# Patient Record
Sex: MALE | Race: WHITE | NOT HISPANIC OR LATINO | Employment: FULL TIME | URBAN - METROPOLITAN AREA
[De-identification: names, ages, dates, MRNs, and addresses within clinical notes are randomized per-mention and may not be internally consistent; named-entity substitution may affect disease eponyms.]

---

## 2017-02-02 ENCOUNTER — DOCUMENTATION ONLY (OUTPATIENT)
Dept: OTOLARYNGOLOGY | Facility: CLINIC | Age: 57
End: 2017-02-02

## 2017-02-02 NOTE — PROGRESS NOTES
Spoke with Mr. Sheehan over the telephone. He derived about 2 weeks of major improvement following his last injection in 2016, but his voice has begun to tire and fatigue again. Nevertheless, it is still improved following potHe wanted to know what he should do from here. I spoke to him about the followin. Repeating the injection augmentation, potentially with a longer lasting injectable  2. Laryngeal framework surgery     I discussed the risks and benefits of each alternative with him. Ideally, he would like a permanent solution via thyroplasty. He is nearing the timepoint beyond which I would not expect meaningful recovery. He underwent his last injection augmentation about 2 months ago.     I recommended that we follow up with me for voice recording and videostroboscopy in early April. Depending on my findings and our discussions, we will likely move toward arranging laryngeal framework surgery in the weeks thereafter.    All questions were answered and he is in agreement with the above.

## 2017-03-31 ENCOUNTER — TELEPHONE (OUTPATIENT)
Dept: OTOLARYNGOLOGY | Facility: CLINIC | Age: 57
End: 2017-03-31

## 2017-03-31 NOTE — TELEPHONE ENCOUNTER
----- Message from Zeyad Dawkins sent at 3/31/2017 10:50 AM CDT -----  Contact: [phone]  Please follow up with Francheska in regard to the pt's cancelled appt and some clarity on whether or not the pt can be seen

## 2017-03-31 NOTE — TELEPHONE ENCOUNTER
Returned call to [facility]. Spoke with William. Clarified appt, and rescheduled with authorization from [facility] per William. Notified Mr. Sheehan of approval and appt rescheduled. Verbalzied understanding.

## 2017-03-31 NOTE — TELEPHONE ENCOUNTER
----- Message from Carlota Richardson sent at 3/31/2017 10:01 AM CDT -----  Call patient about a cancelled appt. Call .

## 2017-03-31 NOTE — TELEPHONE ENCOUNTER
Returned call to patient. Clarified reason for canceled appointment. Patient to call insurance company and call back later today. Verbalized understanding.

## 2017-04-03 ENCOUNTER — OFFICE VISIT (OUTPATIENT)
Dept: OTOLARYNGOLOGY | Facility: CLINIC | Age: 57
End: 2017-04-03
Payer: COMMERCIAL

## 2017-04-03 VITALS
WEIGHT: 270 LBS | DIASTOLIC BLOOD PRESSURE: 87 MMHG | HEIGHT: 75 IN | HEART RATE: 84 BPM | SYSTOLIC BLOOD PRESSURE: 131 MMHG | BODY MASS INDEX: 33.57 KG/M2

## 2017-04-03 DIAGNOSIS — F44.4 HYPERFUNCTIONAL DYSPHONIA: ICD-10-CM

## 2017-04-03 DIAGNOSIS — J38.01 UNILATERAL COMPLETE VOCAL FOLD PARALYSIS: Primary | ICD-10-CM

## 2017-04-03 DIAGNOSIS — R49.9 VOICE DISTURBANCE: ICD-10-CM

## 2017-04-03 PROCEDURE — 31579 LARYNGOSCOPY TELESCOPIC: CPT | Mod: S$PBB,,, | Performed by: OTOLARYNGOLOGY

## 2017-04-03 PROCEDURE — 31579 LARYNGOSCOPY TELESCOPIC: CPT | Mod: PBBFAC | Performed by: OTOLARYNGOLOGY

## 2017-04-03 PROCEDURE — 99214 OFFICE O/P EST MOD 30 MIN: CPT | Mod: PBBFAC,25 | Performed by: OTOLARYNGOLOGY

## 2017-04-03 PROCEDURE — 99214 OFFICE O/P EST MOD 30 MIN: CPT | Mod: 25,S$PBB,, | Performed by: OTOLARYNGOLOGY

## 2017-04-03 PROCEDURE — 99999 PR PBB SHADOW E&M-EST. PATIENT-LVL IV: CPT | Mod: PBBFAC,,, | Performed by: OTOLARYNGOLOGY

## 2017-04-03 RX ORDER — PANTOPRAZOLE SODIUM 40 MG/1
40 TABLET, DELAYED RELEASE ORAL DAILY
COMMUNITY

## 2017-04-03 NOTE — PROGRESS NOTES
"[facility]  Department of Otorhinolaryngology and Communication Sciences    Subjective:      Umer Sheehan is a 56 y.o. male who presents for follow-up. He has right vocal fold immobility, onset of symptoms 6/2016 following total thyroidectomy for non-malignancy. His treatment history is below.    Following his most recent injection augmentation, he eventually settled out to achieving about 3 weeks of stable, near-perfect voicing. Thereafter, his vocal quality began to deteriorate. He is now experiencing vocal weakness, vocal fatigue, and vocal strain. His voice is now fairly serviceable in the morning but does fatigue on him in the afternoon. Voice rest does not seem to help. His vocal symptoms have been stable for the last 1.5-2 months. He desires a definitive solution and is here to discuss his candidacy for thyroplasty.    TREATMENT HISTORY:  1. 7/29/2016 - right vocal fold injection augmentation - restylane-L  2. 11/18/2016 - right vocal fold injection augmentation - restylane-L    The patient's medications, allergies, past medical, surgical, social and family histories were reviewed and updated as appropriate.    A detailed review of systems was obtained with pertinent positives as per the above HPI, and otherwise negative.        Objective:     /87 (BP Location: Right arm, Patient Position: Sitting, BP Method: Automatic)  Pulse 84  Ht 6' 3" (1.905 m)  Wt 122.5 kg (270 lb)  BMI 33.75 kg/m2     Constitutional: comfortable, well dressed  Psychiatric: appropriate affect  Respiratory: comfortably breathing, symmetric chest rise, no stridor  Voice: moderate breathiness, moderate asthenia, moderate strain, high pitched  Head: normocephalic  Eyes: conjunctivae and sclerae clear  Indirect laryngoscopy is limited due to gag    Procedure  Flexible Laryngeal Videostroboscopy (71099): Laryngeal videostroboscopy is indicated to assess laryngeal vibratory biomechanics and vocal fold oscillation, which " cannot be assessed with a plain light examination. This was carried out transnasally with a distal chip videoendoscope. After verbal consent was obtained, the patient was positioned and the nose was topically decongested with 1% phenylephrine and topically anesthetized with 4% lidocaine. The endoscope was passed through the most patent nasal cavity and positioned to image the nasopharynx, larynx, and hypopharynx in detail. The following featured were examined: nasopharyngeal, laryngeal, hypopharyngeal masses; velopharyngeal strength, closure, and symmetry of motion; vocal fold range and symmetry of motion; laryngeal mucosal edema, erythema, inflammation, and hydration; salivary pooling; and gross laryngeal sensation. During phonation, the vocal folds were assesses for glottal closure; mucosal wave; vocal fold lesions; vibratory periodicity, amplitude, and phase symmetry; and vertical height match. The equipment was removed. The patient tolerated the procedure well without complication. All findings were normal except:  - right vocal fold immobile, paramedian; increased tone versus prior to initial injection; no evidence of recovery of function  - mild glottal insufficiency across all frequencies  - small posterior gap, negligible vertical height mismatch  - compensatory supraglottic hyperfunction/spasm      Assessment:     Umer Sheehan is a 56 y.o. male with right vocal fold paralysis, very low likelihood of recovery, secondary to thyroid surgery in 6/2016.     Plan:     Options discussed included repeating the injection with a longer-lasting material or undergoing laryngeal framework surgery.    He would benefit from right laryngeal framework surgery - medialization laryngoplasty, possible arytenoid repositioning procedure, possible cricothyroid procedure -  for the treatment of his condition.    I discussed the risks, benefits and alternatives to surgery with the patient, as well as the expected postoperative  course, with placement of a closed suction drain and overnight observation. Risks included but were not limited to pain; bleeding, hematoma; infection; reaction to the implant; scarring (skin, vocal fold); worsening of voice; implant migration and/or extrusion; need for additional and/or revision procedures; pharyngeal leak, fistula, mediastinitis; and airway obstruction which may necessitate tracheotomy. Also inherent in the procedure are the risks of anesthesia, including but not limited to cardiovascular complications (heart attack, arrhythmia); pulmonary (respiratory failure); neurologic (stroke); and death. I gave him the opportunity to ask questions and I answered all of them.     As usual, we will plan for 1 night of observation postoperatively. He will have preop testing triage by the anesthesiology team. The surgery will be tentatively scheduled for 5/2/2017.  He will return for a postoperative visit 2 weeks after surgery.    All questions were answered, and the patient is in agreement with the plan.    This visit was 25 minutes in duration, with over 50% of the time spent in direct face-to-face counseling and coordination of care regarding the issues outlined above.    Neal Cota M.D.  [facility]  Department of Otorhinolaryngology and Communication Sciences

## 2017-04-03 NOTE — MR AVS SNAPSHOT
[facility]  [address] [facility] [address]  [facility] [address]  Phone: [phone]  Fax: [phone]                  Umer Sheehan   4/3/2017 2:40 PM   Office Visit    Description:  Male : 1960   Provider:  Neal Cota MD   Department:  [facility]           Reason for Visit     Follow-up           Diagnoses this Visit        Comments    Unilateral complete vocal fold paralysis    -  Primary     Voice disturbance         Hyperfunctional dysphonia                To Do List           Goals (5 Years of Data)     None      Follow-Up and Disposition     Return in about 4 weeks (around 2017) for surgery.      [facility] On Call     [facility] On Call Nurse Care Line -  Assistance  Unless otherwise directed by your provider, please contact [facility], our nurse care line that is available for  assistance.     Registered nurses in the [facility] On Call Center provide: appointment scheduling, clinical advisement, health education, and other advisory services.  Call: [phone] (toll free)               Medications           Message regarding Medications     Verify the changes and/or additions to your medication regime listed below are the same as discussed with your clinician today.  If any of these changes or additions are incorrect, please notify your healthcare provider.             Verify that the below list of medications is an accurate representation of the medications you are currently taking.  If none reported, the list may be blank. If incorrect, please contact your healthcare provider. Carry this list with you in case of emergency.           Current Medications     ergocalciferol (VITAMIN D2) 50,000 unit Cap Take 50,000 Units by mouth every 7 days.    levothyroxine (SYNTHROID) 25 MCG tablet Take 25 mcg by mouth once daily.    pantoprazole (PROTONIX) 20 MG tablet Take 20 mg by mouth once daily.    pantoprazole (PROTONIX) 40 MG tablet  "Take 40 mg by mouth.           Clinical Reference Information           Your Vitals Were     BP Pulse Height Weight BMI    131/87 (BP Location: Right arm, Patient Position: Sitting, BP Method: Automatic) 84 6' 3" (1.905 m) 122.5 kg (270 lb) 33.75 kg/m2      Blood Pressure          Most Recent Value    BP  131/87      Allergies as of 4/3/2017     Doxycycline      Immunizations Administered on Date of Encounter - 4/3/2017     None      [facility] Sign-Up     Activating your [facility] account is as easy as 1-2-3!     1) Visit [email or web address], select Sign Up Now, enter this activation code and your date of birth, then select Next.  [email or web address]  Expires: 5/18/2017  4:02 PM      2) Create a username and password to use when you visit [facility] in the future and select a security question in case you lose your password and select Next.    3) Enter your e-mail address and click Sign Up!    Additional Information  If you have questions, please e-mail [email or web address] or call [phone] to talk to our [facility] staff. Remember, [facility] is NOT to be used for urgent needs. For medical emergencies, dial 911.         Instructions    Call with questions       Language Assistance Services     ATTENTION: Language assistance services are available, free of charge. Please call [phone].      ATENCIÓN: Si habla español, tiene a hendrickson disposición servicios gratuitos de asistencia lingüística. Llame al [phone].     CHÚ Ý: N?u b?n nói Ti?ng Vi?t, có các d?ch v? h? tr? ngôn ng? mi?n phí dành cho b?n. G?i s? [phone].         [facility] complies with applicable Federal civil rights laws and does not discriminate on the basis of race, color, national origin, age, disability, or sex.        "

## 2017-04-04 ENCOUNTER — TELEPHONE (OUTPATIENT)
Dept: OTOLARYNGOLOGY | Facility: CLINIC | Age: 57
End: 2017-04-04

## 2017-04-04 RX ORDER — LIDOCAINE HYDROCHLORIDE 10 MG/ML
1 INJECTION, SOLUTION EPIDURAL; INFILTRATION; INTRACAUDAL; PERINEURAL ONCE
Status: CANCELLED | OUTPATIENT
Start: 2017-04-04 | End: 2017-04-04

## 2017-04-04 RX ORDER — DEXAMETHASONE SODIUM PHOSPHATE 4 MG/ML
12 INJECTION, SOLUTION INTRA-ARTICULAR; INTRALESIONAL; INTRAMUSCULAR; INTRAVENOUS; SOFT TISSUE ONCE
Status: CANCELLED | OUTPATIENT
Start: 2017-04-04

## 2017-04-04 NOTE — TELEPHONE ENCOUNTER
Returned call. Patient is requesting further info regarding planned surgery. Let patient know I would have Dr. Cota call at his earliest convenience. Verbalized understanding.

## 2017-04-04 NOTE — TELEPHONE ENCOUNTER
----- Message from Carlota Richardson sent at 4/4/2017  9:27 AM CDT -----  Call patient about his visit on yesterday. Call

## 2017-04-24 ENCOUNTER — ANESTHESIA EVENT (OUTPATIENT)
Dept: SURGERY | Facility: HOSPITAL | Age: 57
End: 2017-04-24
Payer: COMMERCIAL

## 2017-04-24 RX ORDER — LEVOTHYROXINE SODIUM 125 UG/1
125 TABLET ORAL DAILY
COMMUNITY

## 2017-04-24 NOTE — ANESTHESIA PREPROCEDURE EVALUATION
Anesthesia Assessment: Preoperative EQUATION     Planned Procedure: Procedure(s) (LRB):  THYROPLASTY (Right)  Requested Anesthesia Type:Local  Surgeon: Neal Cota MD  Service: ENT  Known or anticipated Date of Surgery:5/2/2017     Surgeon notes: reviewed     Electronic QUestionnaire Assessment completed via nurse interview with patient.     Triage considerations:      The patient has no apparent active cardiac condition (No unstable coronary Syndrome such as severe unstable angina or recent [<1 month] myocardial infarction, decompensated CHF, severe valvular disease or significant arrhythmia)     Previous anesthesia records:Not available     Last PCP note: within 3 months , [facility]     Other important co-morbidities: GERDs/ CHHAYA/ arthritis/ dyslipidemia   ( hx non- toxic goiter)   Tests already available:  no local labs// attempting to retrieve from [place]      Instructions given. (See in Nurse's note)     Optimization:      Plan:   Testing: awaiting lab results      Navigation: 56 yr old male// donald 2// pt states vocal cords damaged during thyroid surgery last year. Pt is a  denies any heart issues// does use Bi-pap. [facility] Florence). Attempting to retrieve records from [place].  Phone  SALOMON Copeland RN BC 4/24/17         Electronically signed by Rosio Copeland RN at 4/24/2017 10:08 AM                                                                                                                 04/24/2017     Umer Sheehan is a 56 y.o., male with vocal cord paralysis after thyroidectomy, here for thyroplasty under local/MAC.    Past Medical History:   Diagnosis Date    Arthritis     Dyslipidemia     GERD (gastroesophageal reflux disease)     Non-toxic goiter     CHHAYA (obstructive sleep apnea)        No past surgical history on file.      Anesthesia Evaluation    I have reviewed the Patient Summary Reports.     I have reviewed the Medications.      Review of Systems  Anesthesia Hx:  No problems with previous Anesthesia  History of prior surgery of interest to airway management or planning: Denies Family Hx of Anesthesia complications.   Denies Personal Hx of Anesthesia complications.   Cardiovascular:  Cardiovascular Normal Exercise tolerance: good  Denies MI.    Denies Angina.    Pulmonary:   Denies COPD.  Denies Asthma. Sleep Apnea    Hepatic/GI:   GERD, well controlled    Neurological:  Neurology Normal    Endocrine:  Endocrine Normal        Physical Exam  General:  Obesity    Airway/Jaw/Neck:  Airway Findings: Mouth Opening: Normal Tongue: Normal  General Airway Assessment: Adult  Mallampati: III  TM Distance: Normal, at least 6 cm      Dental:  Dental Findings: In tact   Chest/Lungs:  Chest/Lungs Findings: Normal Respiratory Rate     Heart/Vascular:  Heart Findings: Rate: Normal        Mental Status:  Mental Status Findings:  Alert and Oriented         Anesthesia Plan  Type of Anesthesia, risks & benefits discussed:  Anesthesia Type:  MAC  Patient's Preference:   Intra-op Monitoring Plan:   Intra-op Monitoring Plan Comments:   Post Op Pain Control Plan:   Post Op Pain Control Plan Comments:   Induction:   IV  Beta Blocker:  Patient is not currently on a Beta-Blocker (No further documentation required).       Informed Consent: Patient understands risks and agrees with Anesthesia plan.  Questions answered. Anesthesia consent signed with patient.  ASA Score: 2     Day of Surgery Review of History & Physical:    H&P update referred to the surgeon.         Ready For Surgery From Anesthesia Perspective.

## 2017-04-24 NOTE — PRE ADMISSION SCREENING
Anesthesia Assessment: Preoperative EQUATION    Planned Procedure: Procedure(s) (LRB):  THYROPLASTY (Right)  Requested Anesthesia Type:Local  Surgeon: Neal Cota MD  Service: ENT  Known or anticipated Date of Surgery:5/2/2017    Surgeon notes: reviewed    Electronic QUestionnaire Assessment completed via nurse interview with patient.    Triage considerations:     The patient has no apparent active cardiac condition (No unstable coronary Syndrome such as severe unstable angina or recent [<1 month] myocardial infarction, decompensated CHF, severe valvular   disease or significant arrhythmia)    Previous anesthesia records:Not available    Last PCP note: within 3 months , [facility]    Other important co-morbidities: GERDs/ CHHAYA/ arthritis/ dyslipidemia     Tests already available:  no local labs// attempting to retrieve from [place]            Instructions given. (See in Nurse's note)    Optimization:      Plan:    Testing:  awaiting lab results       Navigation: 56 yr old male// donald 2// pt states vocal cords damaged during thyroid surgery last year. Pt is a  denies any heart issues// does use Bi-pap. [facility] ( Dr. Florence).  Phone  SALOMON Copeland RN BC 4/24/17

## 2017-05-02 ENCOUNTER — ANESTHESIA (OUTPATIENT)
Dept: SURGERY | Facility: HOSPITAL | Age: 57
End: 2017-05-02
Payer: COMMERCIAL

## 2017-05-02 ENCOUNTER — HOSPITAL ENCOUNTER (OUTPATIENT)
Facility: HOSPITAL | Age: 57
Discharge: HOME | End: 2017-05-03
Attending: OTOLARYNGOLOGY | Admitting: OTOLARYNGOLOGY
Payer: COMMERCIAL

## 2017-05-02 ENCOUNTER — SURGERY (OUTPATIENT)
Age: 57
End: 2017-05-02

## 2017-05-02 DIAGNOSIS — F44.4 HYPERFUNCTIONAL DYSPHONIA: ICD-10-CM

## 2017-05-02 DIAGNOSIS — R49.9 VOICE DISTURBANCE: ICD-10-CM

## 2017-05-02 DIAGNOSIS — J38.01 UNILATERAL COMPLETE VOCAL FOLD PARALYSIS: Primary | ICD-10-CM

## 2017-05-02 PROCEDURE — 71000039 HC RECOVERY, EACH ADD'L HOUR: Performed by: OTOLARYNGOLOGY

## 2017-05-02 PROCEDURE — 25000003 PHARM REV CODE 250: Performed by: NURSE ANESTHETIST, CERTIFIED REGISTERED

## 2017-05-02 PROCEDURE — 37000009 HC ANESTHESIA EA ADD 15 MINS: Performed by: OTOLARYNGOLOGY

## 2017-05-02 PROCEDURE — 27201423 OPTIME MED/SURG SUP & DEVICES STERILE SUPPLY: Performed by: OTOLARYNGOLOGY

## 2017-05-02 PROCEDURE — C1729 CATH, DRAINAGE: HCPCS | Performed by: OTOLARYNGOLOGY

## 2017-05-02 PROCEDURE — 25000003 PHARM REV CODE 250

## 2017-05-02 PROCEDURE — 36000707: Performed by: OTOLARYNGOLOGY

## 2017-05-02 PROCEDURE — D9220A PRA ANESTHESIA: Mod: CRNA,,, | Performed by: NURSE ANESTHETIST, CERTIFIED REGISTERED

## 2017-05-02 PROCEDURE — 63600175 PHARM REV CODE 636 W HCPCS

## 2017-05-02 PROCEDURE — 71000033 HC RECOVERY, INTIAL HOUR: Performed by: OTOLARYNGOLOGY

## 2017-05-02 PROCEDURE — 63600175 PHARM REV CODE 636 W HCPCS: Performed by: NURSE ANESTHETIST, CERTIFIED REGISTERED

## 2017-05-02 PROCEDURE — 36000706: Performed by: OTOLARYNGOLOGY

## 2017-05-02 PROCEDURE — 63600175 PHARM REV CODE 636 W HCPCS: Performed by: OTOLARYNGOLOGY

## 2017-05-02 PROCEDURE — 25000003 PHARM REV CODE 250: Performed by: OTOLARYNGOLOGY

## 2017-05-02 PROCEDURE — 94761 N-INVAS EAR/PLS OXIMETRY MLT: CPT

## 2017-05-02 PROCEDURE — D9220A PRA ANESTHESIA: Mod: ANES,,, | Performed by: ANESTHESIOLOGY

## 2017-05-02 PROCEDURE — 37000008 HC ANESTHESIA 1ST 15 MINUTES: Performed by: OTOLARYNGOLOGY

## 2017-05-02 DEVICE — IMPLANTABLE DEVICE: Type: IMPLANTABLE DEVICE | Site: VOCAL CORD | Status: FUNCTIONAL

## 2017-05-02 RX ORDER — ACETAMINOPHEN 650 MG/20.3ML
650 LIQUID ORAL EVERY 6 HOURS PRN
Status: DISCONTINUED | OUTPATIENT
Start: 2017-05-02 | End: 2017-05-03 | Stop reason: HOSPADM

## 2017-05-02 RX ORDER — DEXTROSE MONOHYDRATE, SODIUM CHLORIDE, AND POTASSIUM CHLORIDE 50; 1.49; 4.5 G/1000ML; G/1000ML; G/1000ML
INJECTION, SOLUTION INTRAVENOUS CONTINUOUS
Status: DISCONTINUED | OUTPATIENT
Start: 2017-05-02 | End: 2017-05-03 | Stop reason: HOSPADM

## 2017-05-02 RX ORDER — AMOXICILLIN 250 MG
1 CAPSULE ORAL 2 TIMES DAILY
Status: DISCONTINUED | OUTPATIENT
Start: 2017-05-02 | End: 2017-05-03 | Stop reason: HOSPADM

## 2017-05-02 RX ORDER — MIDAZOLAM HYDROCHLORIDE 1 MG/ML
INJECTION, SOLUTION INTRAMUSCULAR; INTRAVENOUS
Status: DISCONTINUED | OUTPATIENT
Start: 2017-05-02 | End: 2017-05-02

## 2017-05-02 RX ORDER — CEPHALEXIN 500 MG/1
500 CAPSULE ORAL EVERY 6 HOURS
Qty: 28 CAPSULE | Refills: 0 | Status: SHIPPED | OUTPATIENT
Start: 2017-05-02 | End: 2017-05-07

## 2017-05-02 RX ORDER — CEFAZOLIN SODIUM 2 G/50ML
2 SOLUTION INTRAVENOUS
Status: DISCONTINUED | OUTPATIENT
Start: 2017-05-02 | End: 2017-05-03 | Stop reason: HOSPADM

## 2017-05-02 RX ORDER — PROPOFOL 10 MG/ML
VIAL (ML) INTRAVENOUS
Status: DISCONTINUED | OUTPATIENT
Start: 2017-05-02 | End: 2017-05-02

## 2017-05-02 RX ORDER — EPINEPHRINE 1 MG/ML
INJECTION, SOLUTION INTRACARDIAC; INTRAMUSCULAR; INTRAVENOUS; SUBCUTANEOUS
Status: DISCONTINUED | OUTPATIENT
Start: 2017-05-02 | End: 2017-05-02 | Stop reason: HOSPADM

## 2017-05-02 RX ORDER — METOPROLOL TARTRATE 1 MG/ML
INJECTION, SOLUTION INTRAVENOUS
Status: DISCONTINUED | OUTPATIENT
Start: 2017-05-02 | End: 2017-05-02

## 2017-05-02 RX ORDER — ONDANSETRON 8 MG/1
8 TABLET, ORALLY DISINTEGRATING ORAL EVERY 12 HOURS PRN
Qty: 20 TABLET | Refills: 0 | Status: ON HOLD | OUTPATIENT
Start: 2017-05-02 | End: 2020-01-15 | Stop reason: SDUPTHER

## 2017-05-02 RX ORDER — ONDANSETRON 2 MG/ML
4 INJECTION INTRAMUSCULAR; INTRAVENOUS EVERY 12 HOURS PRN
Status: DISCONTINUED | OUTPATIENT
Start: 2017-05-02 | End: 2017-05-03 | Stop reason: HOSPADM

## 2017-05-02 RX ORDER — HYDROCODONE BITARTRATE AND ACETAMINOPHEN 5; 325 MG/1; MG/1
TABLET ORAL
Status: COMPLETED
Start: 2017-05-02 | End: 2017-05-02

## 2017-05-02 RX ORDER — LIDOCAINE HCL/PF 100 MG/5ML
SYRINGE (ML) INTRAVENOUS
Status: DISCONTINUED | OUTPATIENT
Start: 2017-05-02 | End: 2017-05-02

## 2017-05-02 RX ORDER — DEXAMETHASONE 4 MG/1
8 TABLET ORAL EVERY 8 HOURS
Status: DISCONTINUED | OUTPATIENT
Start: 2017-05-02 | End: 2017-05-03 | Stop reason: HOSPADM

## 2017-05-02 RX ORDER — METHYLPREDNISOLONE 4 MG/1
TABLET ORAL
Qty: 1 PACKAGE | Refills: 0 | Status: SHIPPED | OUTPATIENT
Start: 2017-05-02 | End: 2019-01-21

## 2017-05-02 RX ORDER — SODIUM CHLORIDE 9 MG/ML
INJECTION, SOLUTION INTRAVENOUS CONTINUOUS
Status: DISCONTINUED | OUTPATIENT
Start: 2017-05-02 | End: 2017-05-02

## 2017-05-02 RX ORDER — HYDROCODONE BITARTRATE AND ACETAMINOPHEN 5; 325 MG/1; MG/1
1 TABLET ORAL EVERY 6 HOURS PRN
Qty: 40 TABLET | Refills: 0 | Status: SHIPPED | OUTPATIENT
Start: 2017-05-02 | End: 2019-01-21

## 2017-05-02 RX ORDER — PROPOFOL 10 MG/ML
VIAL (ML) INTRAVENOUS CONTINUOUS PRN
Status: DISCONTINUED | OUTPATIENT
Start: 2017-05-02 | End: 2017-05-02

## 2017-05-02 RX ORDER — MORPHINE SULFATE 2 MG/ML
2 INJECTION, SOLUTION INTRAMUSCULAR; INTRAVENOUS EVERY 4 HOURS PRN
Status: DISCONTINUED | OUTPATIENT
Start: 2017-05-02 | End: 2017-05-03 | Stop reason: HOSPADM

## 2017-05-02 RX ORDER — LIDOCAINE HYDROCHLORIDE 40 MG/ML
INJECTION, SOLUTION RETROBULBAR
Status: DISCONTINUED | OUTPATIENT
Start: 2017-05-02 | End: 2017-05-02 | Stop reason: HOSPADM

## 2017-05-02 RX ORDER — IBUPROFEN 600 MG/1
600 TABLET ORAL EVERY 6 HOURS PRN
Status: DISCONTINUED | OUTPATIENT
Start: 2017-05-02 | End: 2017-05-03 | Stop reason: HOSPADM

## 2017-05-02 RX ORDER — BACITRACIN ZINC 500 UNIT/G
OINTMENT (GRAM) TOPICAL
Status: DISCONTINUED | OUTPATIENT
Start: 2017-05-02 | End: 2017-05-02 | Stop reason: HOSPADM

## 2017-05-02 RX ORDER — BUPIVACAINE HYDROCHLORIDE AND EPINEPHRINE 5; 5 MG/ML; UG/ML
INJECTION, SOLUTION EPIDURAL; INTRACAUDAL; PERINEURAL
Status: DISCONTINUED | OUTPATIENT
Start: 2017-05-02 | End: 2017-05-02 | Stop reason: HOSPADM

## 2017-05-02 RX ORDER — CEFAZOLIN SODIUM 2 G/50ML
2 SOLUTION INTRAVENOUS
Status: DISCONTINUED | OUTPATIENT
Start: 2017-05-02 | End: 2017-05-02

## 2017-05-02 RX ORDER — HYDROCODONE BITARTRATE AND ACETAMINOPHEN 5; 325 MG/1; MG/1
1 TABLET ORAL EVERY 4 HOURS PRN
Status: DISCONTINUED | OUTPATIENT
Start: 2017-05-02 | End: 2017-05-03 | Stop reason: HOSPADM

## 2017-05-02 RX ORDER — LIDOCAINE HYDROCHLORIDE 10 MG/ML
1 INJECTION, SOLUTION EPIDURAL; INFILTRATION; INTRACAUDAL; PERINEURAL ONCE
Status: COMPLETED | OUTPATIENT
Start: 2017-05-02 | End: 2017-05-02

## 2017-05-02 RX ORDER — ZOLPIDEM TARTRATE 5 MG/1
5 TABLET ORAL NIGHTLY PRN
Status: DISCONTINUED | OUTPATIENT
Start: 2017-05-02 | End: 2017-05-03 | Stop reason: HOSPADM

## 2017-05-02 RX ORDER — LIDOCAINE HYDROCHLORIDE AND EPINEPHRINE 10; 10 MG/ML; UG/ML
INJECTION, SOLUTION INFILTRATION; PERINEURAL
Status: DISCONTINUED | OUTPATIENT
Start: 2017-05-02 | End: 2017-05-02 | Stop reason: HOSPADM

## 2017-05-02 RX ORDER — DEXAMETHASONE SODIUM PHOSPHATE 4 MG/ML
12 INJECTION, SOLUTION INTRA-ARTICULAR; INTRALESIONAL; INTRAMUSCULAR; INTRAVENOUS; SOFT TISSUE ONCE
Status: COMPLETED | OUTPATIENT
Start: 2017-05-02 | End: 2017-05-02

## 2017-05-02 RX ADMIN — STANDARDIZED SENNA CONCENTRATE AND DOCUSATE SODIUM 1 TABLET: 8.6; 5 TABLET, FILM COATED ORAL at 09:05

## 2017-05-02 RX ADMIN — MORPHINE SULFATE 2 MG: 2 INJECTION, SOLUTION INTRAMUSCULAR; INTRAVENOUS at 07:05

## 2017-05-02 RX ADMIN — LIDOCAINE HYDROCHLORIDE AND EPINEPHRINE 20 ML: 10; 10 INJECTION, SOLUTION INFILTRATION; PERINEURAL at 02:05

## 2017-05-02 RX ADMIN — CEFAZOLIN SODIUM 2 G: 2 SOLUTION INTRAVENOUS at 07:05

## 2017-05-02 RX ADMIN — LIDOCAINE HYDROCHLORIDE 1 MG: 10 INJECTION, SOLUTION EPIDURAL; INFILTRATION; INTRACAUDAL; PERINEURAL at 10:05

## 2017-05-02 RX ADMIN — LIDOCAINE HYDROCHLORIDE 4 ML: 40 INJECTION, SOLUTION RETROBULBAR; TOPICAL at 04:05

## 2017-05-02 RX ADMIN — HYDROCODONE BITARTRATE AND ACETAMINOPHEN 1 TABLET: 5; 325 TABLET ORAL at 05:05

## 2017-05-02 RX ADMIN — METOPROLOL TARTRATE 1 MG: 5 INJECTION, SOLUTION INTRAVENOUS at 05:05

## 2017-05-02 RX ADMIN — PROPOFOL 50 MCG/KG/MIN: 10 INJECTION, EMULSION INTRAVENOUS at 05:05

## 2017-05-02 RX ADMIN — MIDAZOLAM HYDROCHLORIDE 1 MG: 1 INJECTION, SOLUTION INTRAMUSCULAR; INTRAVENOUS at 03:05

## 2017-05-02 RX ADMIN — BUPIVACAINE HYDROCHLORIDE AND EPINEPHRINE BITARTRATE 20 ML: 5; .0091 INJECTION, SOLUTION EPIDURAL; INTRACAUDAL; PERINEURAL at 02:05

## 2017-05-02 RX ADMIN — DEXTROSE MONOHYDRATE, SODIUM CHLORIDE, AND POTASSIUM CHLORIDE: 50; 4.5; 1.49 INJECTION, SOLUTION INTRAVENOUS at 09:05

## 2017-05-02 RX ADMIN — PROPOFOL 30 MG: 10 INJECTION, EMULSION INTRAVENOUS at 01:05

## 2017-05-02 RX ADMIN — LIDOCAINE HYDROCHLORIDE 75 MG: 20 INJECTION, SOLUTION INTRAVENOUS at 01:05

## 2017-05-02 RX ADMIN — MIDAZOLAM HYDROCHLORIDE 1 MG: 1 INJECTION, SOLUTION INTRAMUSCULAR; INTRAVENOUS at 02:05

## 2017-05-02 RX ADMIN — METOPROLOL TARTRATE 2 MG: 5 INJECTION, SOLUTION INTRAVENOUS at 03:05

## 2017-05-02 RX ADMIN — EPINEPHRINE 1 ML: 1 INJECTION, SOLUTION INTRAMUSCULAR; SUBCUTANEOUS at 02:05

## 2017-05-02 RX ADMIN — MIDAZOLAM HYDROCHLORIDE 2 MG: 1 INJECTION, SOLUTION INTRAMUSCULAR; INTRAVENOUS at 01:05

## 2017-05-02 RX ADMIN — SODIUM CHLORIDE: 0.9 INJECTION, SOLUTION INTRAVENOUS at 11:05

## 2017-05-02 RX ADMIN — MORPHINE SULFATE 2 MG: 2 INJECTION, SOLUTION INTRAMUSCULAR; INTRAVENOUS at 11:05

## 2017-05-02 RX ADMIN — PROPOFOL 50 MG: 10 INJECTION, EMULSION INTRAVENOUS at 01:05

## 2017-05-02 RX ADMIN — PROPOFOL 20 MG: 10 INJECTION, EMULSION INTRAVENOUS at 05:05

## 2017-05-02 RX ADMIN — IBUPROFEN 600 MG: 600 TABLET, FILM COATED ORAL at 09:05

## 2017-05-02 RX ADMIN — COCAINE HYDROCHLORIDE 4 ML: 40 SOLUTION TOPICAL at 02:05

## 2017-05-02 RX ADMIN — DEXAMETHASONE SODIUM PHOSPHATE 12 MG: 4 INJECTION, SOLUTION INTRAMUSCULAR; INTRAVENOUS at 10:05

## 2017-05-02 RX ADMIN — BACITRACIN ZINC 1 TUBE: 500 OINTMENT TOPICAL at 02:05

## 2017-05-02 RX ADMIN — DEXAMETHASONE 8 MG: 4 TABLET ORAL at 09:05

## 2017-05-02 NOTE — BRIEF OP NOTE
[facility]  Brief Operative Note    SUMMARY     Surgery Date: 5/2/2017     Surgeon(s) and Role:     * Dinh King MD - Resident - Assisting     * Neal Cota MD - Primary        Pre-op Diagnosis:  Voice disturbance [R49.9]  Unilateral complete vocal fold paralysis [J38.01]  Hyperfunctional dysphonia [F44.4]    Post-op Diagnosis:  Post-Op Diagnosis Codes:     * Voice disturbance [R49.9]     * Unilateral complete vocal fold paralysis [J38.01]     * Hyperfunctional dysphonia [F44.4]    Procedure(s) (LRB):  THYROPLASTY (Right)    Anesthesia: Local    Description of Procedure: R thyroplasty (type I) with [place] silastic implant    Description of the findings of the procedure: see op note    Estimated Blood Loss: * No values recorded between 5/2/2017  1:42 PM and 5/2/2017  5:32 PM *         Specimens:   Specimen     None

## 2017-05-02 NOTE — H&P (VIEW-ONLY)
"[facility]  Department of Otorhinolaryngology and Communication Sciences    Subjective:      Umer Sheehan is a 56 y.o. male who presents for follow-up. He has right vocal fold immobility, onset of symptoms 6/2016 following total thyroidectomy for non-malignancy. His treatment history is below.    Following his most recent injection augmentation, he eventually settled out to achieving about 3 weeks of stable, near-perfect voicing. Thereafter, his vocal quality began to deteriorate. He is now experiencing vocal weakness, vocal fatigue, and vocal strain. His voice is now fairly serviceable in the morning but does fatigue on him in the afternoon. Voice rest does not seem to help. His vocal symptoms have been stable for the last 1.5-2 months. He desires a definitive solution and is here to discuss his candidacy for thyroplasty.    TREATMENT HISTORY:  1. 7/29/2016 - right vocal fold injection augmentation - restylane-L  2. 11/18/2016 - right vocal fold injection augmentation - restylane-L    The patient's medications, allergies, past medical, surgical, social and family histories were reviewed and updated as appropriate.    A detailed review of systems was obtained with pertinent positives as per the above HPI, and otherwise negative.        Objective:     /87 (BP Location: Right arm, Patient Position: Sitting, BP Method: Automatic)  Pulse 84  Ht 6' 3" (1.905 m)  Wt 122.5 kg (270 lb)  BMI 33.75 kg/m2     Constitutional: comfortable, well dressed  Psychiatric: appropriate affect  Respiratory: comfortably breathing, symmetric chest rise, no stridor  Voice: moderate breathiness, moderate asthenia, moderate strain, high pitched  Head: normocephalic  Eyes: conjunctivae and sclerae clear  Indirect laryngoscopy is limited due to gag    Procedure  Flexible Laryngeal Videostroboscopy (16045): Laryngeal videostroboscopy is indicated to assess laryngeal vibratory biomechanics and vocal fold oscillation, which " cannot be assessed with a plain light examination. This was carried out transnasally with a distal chip videoendoscope. After verbal consent was obtained, the patient was positioned and the nose was topically decongested with 1% phenylephrine and topically anesthetized with 4% lidocaine. The endoscope was passed through the most patent nasal cavity and positioned to image the nasopharynx, larynx, and hypopharynx in detail. The following featured were examined: nasopharyngeal, laryngeal, hypopharyngeal masses; velopharyngeal strength, closure, and symmetry of motion; vocal fold range and symmetry of motion; laryngeal mucosal edema, erythema, inflammation, and hydration; salivary pooling; and gross laryngeal sensation. During phonation, the vocal folds were assesses for glottal closure; mucosal wave; vocal fold lesions; vibratory periodicity, amplitude, and phase symmetry; and vertical height match. The equipment was removed. The patient tolerated the procedure well without complication. All findings were normal except:  - right vocal fold immobile, paramedian; increased tone versus prior to initial injection; no evidence of recovery of function  - mild glottal insufficiency across all frequencies  - small posterior gap, negligible vertical height mismatch  - compensatory supraglottic hyperfunction/spasm      Assessment:     Umer Sheehan is a 56 y.o. male with right vocal fold paralysis, very low likelihood of recovery, secondary to thyroid surgery in 6/2016.     Plan:     Options discussed included repeating the injection with a longer-lasting material or undergoing laryngeal framework surgery.    He would benefit from right laryngeal framework surgery - medialization laryngoplasty, possible arytenoid repositioning procedure, possible cricothyroid procedure -  for the treatment of his condition.    I discussed the risks, benefits and alternatives to surgery with the patient, as well as the expected postoperative  course, with placement of a closed suction drain and overnight observation. Risks included but were not limited to pain; bleeding, hematoma; infection; reaction to the implant; scarring (skin, vocal fold); worsening of voice; implant migration and/or extrusion; need for additional and/or revision procedures; pharyngeal leak, fistula, mediastinitis; and airway obstruction which may necessitate tracheotomy. Also inherent in the procedure are the risks of anesthesia, including but not limited to cardiovascular complications (heart attack, arrhythmia); pulmonary (respiratory failure); neurologic (stroke); and death. I gave him the opportunity to ask questions and I answered all of them.     As usual, we will plan for 1 night of observation postoperatively. He will have preop testing triage by the anesthesiology team. The surgery will be tentatively scheduled for 5/2/2017.  He will return for a postoperative visit 2 weeks after surgery.    All questions were answered, and the patient is in agreement with the plan.    This visit was 25 minutes in duration, with over 50% of the time spent in direct face-to-face counseling and coordination of care regarding the issues outlined above.    Neal Cota M.D.  [facility]  Department of Otorhinolaryngology and Communication Sciences

## 2017-05-02 NOTE — IP AVS SNAPSHOT
[facility]  [address]  [facility] [address]  Phone: [phone]           Patient Discharge Instructions   Our goal is to set you up for success. This packet includes information on your condition, medications, and your home care.  It will help you care for yourself to prevent having to return to the hospital.     Please ask your nurse if you have any questions.      There are many details to remember when preparing to leave the hospital. Here is what you will need to do:    1. Take your medicine. If you are prescribed medications, review your Medication List on the following pages. You may have new medications to  at the pharmacy and others that you'll need to stop taking. Review the instructions for how and when to take your medications. Talk with your doctor or nurses if you are unsure of what to do.     2. Go to your follow-up appointments. Specific follow-up information is listed in the following pages. Your may be contacted by a nurse or clinical provider about future appointments. Be sure we have all of the phone numbers to reach you. Please contact your provider's office if you are unable to make an appointment.     3. Watch for warning signs. Your doctor or nurse will give you detailed warning signs to watch for and when to call for assistance. These instructions may also include educational information about your condition. If you experience any of warning signs to your health, call your doctor.           [facility]  Unless otherwise directed by your provider, please   contact [facility], our nurse care line   that is available for 24/7 assistance.     [phone] (toll-free)     Registered nurses in the [facility]   provide: appointment scheduling, clinical advisement, health education, and other advisory services.                  ** Verify the list of medication(s) below is accurate and up to date. Carry this with you in case of  emergency. If your medications have changed, please notify your healthcare provider.             Medication List      START taking these medications        Additional Info                      cephALEXin 500 MG capsule   Commonly known as:  KEFLEX   Quantity:  28 capsule   Refills:  0   Dose:  500 mg    Instructions:  Take 1 capsule (500 mg total) by mouth every 6 (six) hours.     Begin Date    AM    Noon    PM    Bedtime       hydrocodone-acetaminophen 5-325mg 5-325 mg per tablet   Commonly known as:  NORCO   Quantity:  40 tablet   Refills:  0   Dose:  1 tablet    Last time this was given:  1 tablet on 5/3/2017  1:43 AM   Instructions:  Take 1 tablet by mouth every 6 (six) hours as needed for Pain.     Begin Date    AM    Noon    PM    Bedtime       methylPREDNISolone 4 mg tablet   Commonly known as:  MEDROL DOSEPACK   Quantity:  1 Package   Refills:  0    Instructions:  use as directed     Begin Date    AM    Noon    PM    Bedtime       ondansetron 8 MG Tbdl   Commonly known as:  ZOFRAN-ODT   Quantity:  20 tablet   Refills:  0   Dose:  8 mg    Instructions:  Take 1 tablet (8 mg total) by mouth every 12 (twelve) hours as needed.     Begin Date    AM    Noon    PM    Bedtime         CONTINUE taking these medications        Additional Info                      BENTYL ORAL   Refills:  0    Instructions:  Take by mouth 3 (three) times daily with meals.     Begin Date    AM    Noon    PM    Bedtime       * pantoprazole 20 MG tablet   Commonly known as:  PROTONIX   Refills:  0   Dose:  20 mg    Instructions:  Take 20 mg by mouth once daily.     Begin Date    AM    Noon    PM    Bedtime       * pantoprazole 40 MG tablet   Commonly known as:  PROTONIX   Refills:  0   Dose:  40 mg    Instructions:  Take 40 mg by mouth.     Begin Date    AM    Noon    PM    Bedtime       * SYNTHROID 25 MCG tablet   Refills:  0   Dose:  25 mcg   Generic drug:  levothyroxine    Instructions:  Take 25 mcg by mouth once daily.     Begin Date     AM    Noon    PM    Bedtime       * levothyroxine 125 MCG tablet   Commonly known as:  SYNTHROID   Refills:  0   Dose:  125 mcg    Instructions:  Take 125 mcg by mouth once daily.     Begin Date    AM    Noon    PM    Bedtime       VITAMIN D-3 ORAL   Refills:  0    Instructions:  Take by mouth once daily.     Begin Date    AM    Noon    PM    Bedtime       VITAMIN D2 50,000 unit Cap   Refills:  0   Dose:  38771 Units   Generic drug:  ergocalciferol    Instructions:  Take 50,000 Units by mouth every 7 days.     Begin Date    AM    Noon    PM    Bedtime       * Notice:  This list has 4 medication(s) that are the same as other medications prescribed for you. Read the directions carefully, and ask your doctor or other care provider to review them with you.         Where to Get Your Medications      You can get these medications from any pharmacy     Bring a paper prescription for each of these medications     cephALEXin 500 MG capsule    hydrocodone-acetaminophen 5-325mg 5-325 mg per tablet    methylPREDNISolone 4 mg tablet    ondansetron 8 MG Tbdl                  Please bring to all follow up appointments:    1. A copy of your discharge instructions.  2. All medicines you are currently taking in their original bottles.  3. Identification and insurance card.    Please arrive 15 minutes ahead of scheduled appointment time.    Please call 24 hours in advance if you must reschedule your appointment and/or time.        Follow-up Information     Follow up with Neal Cota MD. Schedule an appointment as soon as possible for a visit in 2 weeks.    Specialty:  Otolaryngology    Why:  For wound re-check    Contact information:    [address]  [facility] [address]  [phone]          Discharge Instructions     Future Orders    Activity as tolerated     Comments:    Light activity only. No heavy lifting, straining, stooping, exercising.    Call MD for:  difficulty breathing or increased cough     Call MD for:   "increased confusion or weakness     Call MD for:  persistent dizziness, light-headedness, or visual disturbances     Call MD for:  persistent nausea and vomiting or diarrhea     Call MD for:  redness, tenderness, or signs of infection (pain, swelling, redness, odor or green/yellow discharge around incision site)     Call MD for:  severe persistent headache     Call MD for:  severe uncontrolled pain     Call MD for:  temperature >100.4     Call MD for:  worsening rash     Diet general     Questions:    Total calories:      Fat restriction, if any:      Protein restriction, if any:      Na restriction, if any:      Fluid restriction:      Additional restrictions:      Lifting restrictions     No dressing needed     Weight bearing restrictions (specify)         Primary Diagnosis     Your primary diagnosis was:  Complete Paralysis Of One Vocal Cord      Admission Information     Date & Time Provider Department CSN    5/2/2017  9:26 AM Neal Cota MD [facility] [id number]      Care Providers     Provider Role Specialty Primary office phone    Neal Cota MD Attending Provider Otolaryngology [phone]    Neal Cota MD Surgeon  Otolaryngology [phone]      Your Vitals Were     BP Pulse Temp Resp Height Weight    140/82 (BP Location: Right arm, Patient Position: Lying, BP Method: Automatic) 89 96.1 °F (35.6 °C) (Oral) 14 6' 3" (1.905 m) 122.5 kg (270 lb)    SpO2 BMI             94% 33.75 kg/m2         Recent Lab Values     No lab values to display.      Allergies as of 5/3/2017        Reactions    Doxycycline       Advance Directives     An advance directive is a document which, in the event you are no longer able to make decisions for yourself, tells your healthcare team what kind of treatment you do or do not want to receive, or who you would like to make those decisions for you.  If you do not currently have an advance directive, [facility] encourages you to create one.  For " more information call:  [phone]), [phone] ([phone]),  or log on to [email or web address].        Language Assistance Services     ATTENTION: Language assistance services are available, free of charge. Please call [phone].      ATENCIÓN: Si habla emmanuel, tiene a hendrickson disposición servicios gratuitos de asistencia lingüística. Llame al [phone].     [facility] Ý: N?u b?n nói Ti?ng Vi?t, có các d?ch v? h? tr? ngôn ng? mi?n phí dành cho b?n. G?i s? [phone].        [facility] Sign-Up     Activating your [facility] account is as easy as 1-2-3!     1) Visit [email or web address], select Sign Up Now, enter this activation code and your date of birth, then select Next.  [email or web address]  Expires: 5/18/2017  4:02 PM      2) Create a username and password to use when you visit [facility] in the future and select a security question in case you lose your password and select Next.    3) Enter your e-mail address and click Sign Up!    Additional Information  If you have questions, please e-mail [email or web address] or call [phone] to talk to our [facility] staff. Remember, [facility] is NOT to be used for urgent needs. For medical emergencies, dial 911.          [facility] complies with applicable Federal civil rights laws and does not discriminate on the basis of race, color, national origin, age, disability, or sex.

## 2017-05-02 NOTE — TRANSFER OF CARE
"Anesthesia Transfer of Care Note    Patient: Umer Sheehan    Procedure(s) Performed: Procedure(s) (LRB):  THYROPLASTY (Right)    Patient location: PACU    Anesthesia Type: MAC    Transport from OR: Transported from OR on room air with adequate spontaneous ventilation    Post pain: adequate analgesia    Post assessment: no apparent anesthetic complications    Post vital signs: stable    Level of consciousness: awake, oriented and alert    Nausea/Vomiting: no nausea/vomiting    Complications: none          Last vitals:   Visit Vitals    BP (!) 147/84    Pulse 101    Temp 36.4 °C (97.5 °F) (Oral)    Resp 20    Ht 6' 3" (1.905 m)    Wt 122.5 kg (270 lb)    SpO2 95%    BMI 33.75 kg/m2     "

## 2017-05-02 NOTE — INTERVAL H&P NOTE
The patient has been examined and the H&P has been reviewed:    I concur with the findings and no changes have occurred since H&P was written.  Plan to proceed with right (possible bilateral) laryngeal framework surgery - medialization laryngoplasty, possible arytenoid repositioning procedure, possible cricothyroid procedure. Pt will remain in OBS overnight as planned.    Anesthesia/Surgery risks, benefits and alternative options discussed and understood by patient/family.          Active Hospital Problems    Diagnosis  POA    Unilateral complete vocal fold paralysis [J38.01]  Yes      Resolved Hospital Problems    Diagnosis Date Resolved POA   No resolved problems to display.

## 2017-05-03 VITALS
HEART RATE: 89 BPM | SYSTOLIC BLOOD PRESSURE: 140 MMHG | WEIGHT: 270 LBS | DIASTOLIC BLOOD PRESSURE: 82 MMHG | OXYGEN SATURATION: 94 % | HEIGHT: 75 IN | TEMPERATURE: 96 F | RESPIRATION RATE: 14 BRPM | BODY MASS INDEX: 33.57 KG/M2

## 2017-05-03 PROCEDURE — 36000707: Performed by: OTOLARYNGOLOGY

## 2017-05-03 PROCEDURE — 36000706: Performed by: OTOLARYNGOLOGY

## 2017-05-03 PROCEDURE — 63600175 PHARM REV CODE 636 W HCPCS

## 2017-05-03 PROCEDURE — 25000003 PHARM REV CODE 250

## 2017-05-03 PROCEDURE — 31591 LARYNGOPLASTY MEDIALIZATION: CPT | Mod: LT,,, | Performed by: OTOLARYNGOLOGY

## 2017-05-03 RX ADMIN — STANDARDIZED SENNA CONCENTRATE AND DOCUSATE SODIUM 1 TABLET: 8.6; 5 TABLET, FILM COATED ORAL at 09:05

## 2017-05-03 RX ADMIN — MORPHINE SULFATE 2 MG: 2 INJECTION, SOLUTION INTRAMUSCULAR; INTRAVENOUS at 06:05

## 2017-05-03 RX ADMIN — DEXAMETHASONE 8 MG: 4 TABLET ORAL at 05:05

## 2017-05-03 RX ADMIN — HYDROCODONE BITARTRATE AND ACETAMINOPHEN 1 TABLET: 5; 325 TABLET ORAL at 09:05

## 2017-05-03 RX ADMIN — HYDROCODONE BITARTRATE AND ACETAMINOPHEN 1 TABLET: 5; 325 TABLET ORAL at 01:05

## 2017-05-03 RX ADMIN — ZOLPIDEM TARTRATE 5 MG: 5 TABLET, FILM COATED ORAL at 01:05

## 2017-05-03 RX ADMIN — CEFAZOLIN SODIUM 2 G: 2 SOLUTION INTRAVENOUS at 04:05

## 2017-05-03 NOTE — ANESTHESIA RELEASE NOTE
"Anesthesia Release from PACU Note    Patient: Umer Sheehan    Procedure(s) Performed: Procedure(s) (LRB):  THYROPLASTY (Right)    Anesthesia type: general    Post pain: Adequate analgesia    Post assessment: no apparent anesthetic complications, tolerated procedure well and no evidence of recall    Last Vitals:   Visit Vitals    BP (!) 151/87 (BP Location: Right arm, Patient Position: Lying, BP Method: Automatic)    Pulse 96    Temp 36.8 °C (98.3 °F) (Oral)    Resp 16    Ht 6' 3" (1.905 m)    Wt 122.5 kg (270 lb)    SpO2 96%    BMI 33.75 kg/m2       Post vital signs: stable    Level of consciousness: awake, alert  and oriented    Nausea/Vomiting: no nausea/no vomiting    Complications: none    Airway Patency: patent    Respiratory: unassisted, spontaneous ventilation, room air    Cardiovascular: stable and blood pressure at baseline    Hydration: euvolemic     "

## 2017-05-03 NOTE — PROGRESS NOTES
Discharge instructions and prescriptions given and explained to patient. Pt verbalized understanding and questions answered by RN. IV discontinued. Spouse at bedside. Pt left unit ambulating with spouse holding belongings.

## 2017-05-03 NOTE — DISCHARGE SUMMARY
[facility]  Discharge Summary  Otorhinolaryngology      Admit Date: 5/2/2017    Discharge Date and Time:  05/03/2017 7:32 AM    Attending Physician: Neal Cota MD     Discharge Physician: Dinh King    Reason for Admission: airway obs s/p thyroplasty    Procedures Performed: Procedure(s) (LRB):  THYROPLASTY (Right)    Hospital Course (synopsis of major diagnoses, care, treatment, and services provided during the course of the hospital stay): Following completion of an electively scheduled procedure, the patient was transferred to the Floor for postoperative monitoring.   His hospital course was uneventful and noted for adequate pain control and PO intake following surgery.  he is discharged home in good condition and will follow-up with Dr. Cota as scheduled.          Consults: none    Significant Diagnostic Studies: n/a    Final Diagnoses:    Principal Problem: Unilateral complete vocal fold paralysis   Secondary Diagnoses:   Active Hospital Problems    Diagnosis  POA    *Unilateral complete vocal fold paralysis [J38.01]  Yes      Resolved Hospital Problems    Diagnosis Date Resolved POA   No resolved problems to display.    and n/a    Discharged Condition: good    Disposition: Home or Self Care    Follow Up/Patient Instructions:      Medications:  Reconciled Home Medications:   Current Discharge Medication List      START taking these medications    Details   cephALEXin (KEFLEX) 500 MG capsule Take 1 capsule (500 mg total) by mouth every 6 (six) hours.  Qty: 28 capsule, Refills: 0      hydrocodone-acetaminophen 5-325mg (NORCO) 5-325 mg per tablet Take 1 tablet by mouth every 6 (six) hours as needed for Pain.  Qty: 40 tablet, Refills: 0      methylPREDNISolone (MEDROL DOSEPACK) 4 mg tablet use as directed  Qty: 1 Package, Refills: 0      ondansetron (ZOFRAN-ODT) 8 MG TbDL Take 1 tablet (8 mg total) by mouth every 12 (twelve) hours as needed.  Qty: 20 tablet, Refills: 0          CONTINUE these medications which have NOT CHANGED    Details   CALCIUM CARBONATE/VITAMIN D3 (VITAMIN D-3 ORAL) Take by mouth once daily.      DICYCLOMINE HCL (BENTYL ORAL) Take by mouth 3 (three) times daily with meals.      !! levothyroxine (SYNTHROID) 125 MCG tablet Take 125 mcg by mouth once daily.      !! levothyroxine (SYNTHROID) 25 MCG tablet Take 25 mcg by mouth once daily.      !! pantoprazole (PROTONIX) 40 MG tablet Take 40 mg by mouth.      ergocalciferol (VITAMIN D2) 50,000 unit Cap Take 50,000 Units by mouth every 7 days.      !! pantoprazole (PROTONIX) 20 MG tablet Take 20 mg by mouth once daily.       !! - Potential duplicate medications found. Please discuss with provider.        Follow-up Information     Follow up with Neal Cota MD. Schedule an appointment as soon as possible for a visit in 2 weeks.    Specialty:  Otolaryngology    Why:  For wound re-check    Contact information:    [address] [facility] [address]  [phone]            Discharge Procedure Orders  Diet general     Activity as tolerated   Order Comments: Light activity only. No heavy lifting, straining, stooping, exercising.     Weight bearing restrictions (specify)     Lifting restrictions     Call MD for:  temperature >100.4     Call MD for:  persistent nausea and vomiting or diarrhea     Call MD for:  severe uncontrolled pain     Call MD for:  redness, tenderness, or signs of infection (pain, swelling, redness, odor or green/yellow discharge around incision site)     Call MD for:  difficulty breathing or increased cough     Call MD for:  severe persistent headache     Call MD for:  worsening rash     Call MD for:  persistent dizziness, light-headedness, or visual disturbances     Call MD for:  increased confusion or weakness     No dressing needed

## 2017-05-03 NOTE — PROGRESS NOTES
Otolaryngology - Head and Neck Surgery  Progress Note    Subjective:  NAEON; no reps issues; voice stable post-op; OOB to void; jewell PO diet     Objective:  Temp:  [97.5 °F (36.4 °C)-98.3 °F (36.8 °C)]   Pulse:  []   Resp:  [16-23]   BP: (130-165)/()   SpO2:  [92 %-99 %]       Physical Exam:  NAD, aao x 3  No resp distress on RA  voice slightly hoarse and high pitched, stable from surgery  Neck soft      DRAINS:  ALEXIS r neck 23 cc SS -harvested    CBC  No results for input(s): WBC, HGB, HCT, MCV, PLT in the last 72 hours.  BMP  No results for input(s): GLU, NA, K, CL, CO2, BUN, CREATININE, CALCIUM, PHOS, MG, PREALBUMIN in the last 72 hours.  COAGS  No results for input(s): PROTIME, INR, PTT in the last 72 hours.      Imaging Results     None             Assessment: 56 y.o. 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 Stable for d/c    Plan:   D/c with keflex, norco, medrol, and zofran  RTC w Dr. Cota 2 weeks  Light duty  Reg diet    Dinh King MD

## 2017-05-03 NOTE — NURSING TRANSFER
Nursing Transfer Note      5/2/2017     Transfer To: 624    Transfer via stretcher    Transfer with room air    Transported by Pct    Medicines sent: No    Chart send with patient: Yes    Notified: spouse

## 2017-05-03 NOTE — ANESTHESIA POSTPROCEDURE EVALUATION
"Anesthesia Post Evaluation    Patient: Umer Sheehan    Procedure(s) Performed: Procedure(s) (LRB):  THYROPLASTY (Right)    Final Anesthesia Type: general  Patient location during evaluation: PACU  Patient participation: Yes- Able to Participate  Level of consciousness: awake and alert and oriented  Post-procedure vital signs: reviewed and stable  Pain management: adequate  Airway patency: patent  PONV status at discharge: No PONV  Anesthetic complications: no      Cardiovascular status: blood pressure returned to baseline  Respiratory status: unassisted and room air  Hydration status: euvolemic  Follow-up not needed.        Visit Vitals    BP (!) 151/87 (BP Location: Right arm, Patient Position: Lying, BP Method: Automatic)    Pulse 96    Temp 36.8 °C (98.3 °F) (Oral)    Resp 16    Ht 6' 3" (1.905 m)    Wt 122.5 kg (270 lb)    SpO2 96%    BMI 33.75 kg/m2       Pain/Kurtis Score: Pain Assessment Performed: Yes (5/2/2017  6:35 PM)  Presence of Pain: denies (5/2/2017  6:35 PM)  Pain Rating Prior to Med Admin: 7 (5/2/2017  7:11 PM)  Kurtis Score: 10 (5/2/2017  6:35 PM)      "

## 2017-05-03 NOTE — PLAN OF CARE
Problem: Fall Risk (Adult)  Goal: Absence of Falls  Patient will demonstrate the desired outcomes by discharge/transition of care.   Outcome: Ongoing (interventions implemented as appropriate)  Plan of care reviewed with patient. Patient is oriented x4. Patient incisions remained intact. Drain remained intact throughout night. Patient remained free of any falls or acute events, VSS, Will continue to monitor.

## 2017-05-04 NOTE — OP NOTE
DATE OF PROCEDURE:  05/03/2017.    PREOPERATIVE DIAGNOSIS:  Right vocal fold paralysis.    POSTOPERATIVE DIAGNOSIS:  Right vocal fold paralysis.    PROCEDURE:  Right medialization laryngoplasty using a Silastic implant.    SURGEON:  Neal Cota M.D.    ASSISTANT:  Dinh King M.D. (RES).    ANESTHESIA:  Local with monitored anesthesia care.    BLOOD LOSS:  Minimal.    COMPLICATIONS:  None.    SPECIMENS:  None.    FINDINGS:  Improved medialization, contour, and support of the right true vocal   fold was noted--with a concomitant improvement in glottic closure based on of   visual and auditory analysis--was achieved with a [place] Silastic block implant   whose point of maximal medialization was 6 mm deep at the posteriormost aspect of   the thyroplasty window, along its inferiormost extent.    INDICATIONS FOR PROCEDURE:  The patient is a 56-year-old gentleman with a   history of right vocal fold paralysis following total thyroidectomy in June 2016.  In the past, he has undergone serial vocal fold injection augmentation   procedures and did derive mild benefit from these interventions, albeit in a   temporary fashion.  He desired a durable improvement in his voice and was   therefore offered laryngeal framework surgery.  Prior to the procedure, the   risks, benefits and options were discussed at length with the patient.  Risks   included but were not limited to pain, bleeding, infection, scar, continued   dysphonia, worsening of voice, implant migration or extrusion, need for   additional or revision procedures, reaction to the implant, pharyngeal leak,   fistula, mediastinitis and airway obstruction.  In spite of the risks of   surgery, the patient agreed to move forward with the procedure.  Informed   consent was obtained.    PROCEDURE IN DETAIL:  The patient was positively identified in the preoperative   holding area.  He was brought to the Operating Room and was placed in the supine   beach chair  position.  Monitored anesthesia care was administered.  A 5 cm skin   incision was marked overlying the right thyroid ala within a preexisting skin   crease.  In an intermittent fashion throughout the case, the nasal cavities were   topically anesthetized and decongested with pledgets soaked in a solution of 4%   cocaine.  Preoperative flexible fiberoptic videolaryngoscopy was performed to confirm a right   vocal fold paralysis with a small posterior gap and negligible vertical height   mismatch.  The right side of the neck was infiltrated with field block of local   anesthetic in the form of a 50:50 mixture of 1% lidocaine with epinephrine at a   concentration of 1:100,000 and 0.5% Marcaine with epinephrine at a concentration   of 1:200,000.  Total volume administered was 10 mL.  The neck was thereafter   prepped and draped in the usual sterile fashion.  A final timeout was performed   for verification purposes.  A skin incision was made using a 15 blade and was   deepened through the level of platysma using Bovie electrocautery on a low   wattage setting.  Subplatysmal flaps were elevated superiorly to the level of   the hyoid bone and inferiorly to the level of the cricoid cartilage.  Next,   dissection proceeded through the midline neck via the avascular midline raphe of   the strap muscles.  The sternohyoid muscle was reflected laterally on each   side, exposing the underlying sternothyroid and thyrohyoid muscles.  The medial   aspect of the sternohyoid muscle was divided later in the case in order to   achieve improved lateral laryngeal exposure.  The thyrohyoid muscle was   dissected off the right thyroid ala near its attachment at the oblique line and   was there divided with Bovie electrocautery on a low wattage setting.  This   resulted in improved medial wall rotation of the laryngotracheal complex.    Additional layers of post-surgical scar and pre-laryngeal investing   fascia was carefully dissected and  reflected laterally.  Next, a laterally based perichondrial  flap was created along the right thyroid ala. The flap was elevated posteriorly using a   Platte elevator and blunt dissection.  The inferior aspect of the thyroid   cartilage was identified, as was the inferior thyroid muscular tubercle.  Next,   attention was turned to outlining the thyroplasty window.  The anteroinferior   corner of the window was positioned 7 mm back from the midline and 2 mm above   the inferior free edge of the thyroid cartilage.  A standard [place]   thyroplasty window was outlined measuring 6 mm in height and 13 mm in length.    The window was initiated using a size 3 cutting bur and was completed along the   inner table of the right thyroid cartilage using the stapes curette, exposing   the inner thyroid perichondrium.  The dimensions of the window were verified on   several occasions using the window sizer.  After precise creation of the   cartilage window had been created, the thyroid perichondrium was incised with a   [place] blade.  The perichondrium was thereafter dissected from within the thyroplasty window  and was discarded.  Next, the Traill elevator was used to liberate the paraglottic   space around the posterior and inferior margins of the window.  Additional mild   elevation of the paraglottic space was performed via the superior aspect of the window.   Additional elevation of the paraglottic space was achieved from beneath the inferior strut of   the window via the cricothyroid space. This resulted in complete mobilization of the   paraglottic space and dynamic motion of this anatomical space throughout the   respiratory cycle.  Next, the patient was brought through a variety of phonatory   tasks. A high-pitched and breathy voice was noted, with a maximal phonation time approximately 5   seconds.  The depth gauge was positioned in the thyroplasty window while the   larynx was inspected via flexible fiberoptic  videolaryngoscopy.  Careful   palpation at the level of the window verified that the window was in the   appropriate position for medialization of the membranous vocal fold.  Next, the   patient was brought through a variety of phonatory tasks in order to ascertain   the level of support which might be necessary for the medialization implant.    Initially, optimal voicing was noted with 7 mm of support, along the inferior   aspect of the window, 3/4 of the way back.  A thyroplasty implant was fashioned   to these specifications.  Thereafter, the implant was carefully positioned   through the thyroplasty window in the paraglottic space and was noted to be   Secure, in a lock-and-key fashion.  Immediately, remarkable parameters in the   patient's vocal quality was noted; however, he did persist with mild roughness   and occasional overclosure based on auditory feedback as well as visual   feedback via flexible fiberoptic video laryngoscopy.  Nevertheless, the implant   was noted to be in the appropriate position, free of any anterior   overcorrection or effacement of the ventricle.  Due to the presence of   audible and visual overcorrection, a slight modification of the initial implant   was performed.  The implant was removed.  It was modified so as to achieve less   pronounced support at its point of maximum medialization.  This was thereafter   once more repositioned in the window, but this did not achieve adequate   improvement in the patient's vocal quality.  Therefore, this implant was removed   and was discarded.  Next, the depth gauge was again positioned within the right   paraglottic space and the point of maximum medialization was noted to be   optimal at 6 mm, directly along the inferior aspect of the thyroplasty window at its   posterior corner with a gentle taper anterior to this.  A Silastic   implant was fashioned to these specifications with attention to refrain from   creating any excess anterior  overcorrection.  This implant was thereafter   positioned within the thyroplasty window and was found to fit securely in place in   a lock and key fashion.  The patient was thereafter brought through a variety   of phonatory tasks and intermittent flexible fiberoptic video laryngoscopy was   carried out.  Based on perceptual impression of the clinicians and the patient,   remarkable improvements in his voice were noted. Maximal phonation time   was noted to be approximately 15 seconds.  He did persist with intermittent   roughness and strain, but these qualities did unload with semi-occluded true vocal tract   exercises.  On flexible fiberoptic video laryngoscopy, the implant was noted to   be in appropriate position, free of any excess anterior overcorrection or of any   effacement of the ventricle.  There was a negligible posterior gap at this   point and negligible at best vertical height mismatch. Thus no arytenoid procedure was  carried out.  The laryngoscope was removed from the nose.  The implant   was secured to the inferior thyroplasty window strut using a suture of 2-0   Prolene.  The sternohyoid muscle was reapproximated using interrupted mattress   sutures of 3-0 chromic.  A 10-Greek Onel-Ricci drain was placed beneath the   strap muscles in the neck and exited the neck via a separate stab incision.  The   perichondrial flap was reapproximated using interrupted sutures of 4-0 Vicryl.    The strap muscles were reapproximated in the midline using interrupted sutures   of 3-0 Vicryl.  The platysmal layer was closed using buried interrupted sutures   of 3-0 Vicryl.  The deep dermal layer was closed using buried interrupted   sutures of 4-0 Vicryl.  The drain was secured using a suture of 3-0 nylon.  The   skin incision was closed using Dermabond adhesive.  With this, the procedure was   brought to completion.  The patient was turned back over to the Anesthesiology   team for full emergence from  anesthesia, which was uneventful.  The patient was   escorted to the Recovery Room in good condition.  The patient tolerated the   procedure well.  There were no complications.  All needle, sponge and instrument   counts were correct at the end of the case.    ATTESTATION:  I, as the attending of record, was present and participated in all   portions of this procedure.      ANUPAM  dd: 05/03/2017 16:29:36 (CDT)  td: 05/03/2017 21:52:10 (CDT)  Doc ID   #[id number]  Job ID #[id number]    CC:

## 2017-05-10 ENCOUNTER — TELEPHONE (OUTPATIENT)
Dept: OTOLARYNGOLOGY | Facility: CLINIC | Age: 57
End: 2017-05-10

## 2017-05-12 ENCOUNTER — TELEPHONE (OUTPATIENT)
Dept: OTOLARYNGOLOGY | Facility: CLINIC | Age: 57
End: 2017-05-12

## 2017-05-12 NOTE — TELEPHONE ENCOUNTER
----- Message from Rosio Galvin sent at 5/12/2017  8:42 AM CDT -----  Contact: [phone]  Please call above patient has post op question for the nurse thanks

## 2017-05-19 ENCOUNTER — OFFICE VISIT (OUTPATIENT)
Dept: OTOLARYNGOLOGY | Facility: CLINIC | Age: 57
End: 2017-05-19
Payer: COMMERCIAL

## 2017-05-19 VITALS
SYSTOLIC BLOOD PRESSURE: 136 MMHG | HEART RATE: 76 BPM | BODY MASS INDEX: 33.37 KG/M2 | TEMPERATURE: 98 F | WEIGHT: 267 LBS | DIASTOLIC BLOOD PRESSURE: 90 MMHG

## 2017-05-19 DIAGNOSIS — F44.4 HYPERFUNCTIONAL DYSPHONIA: ICD-10-CM

## 2017-05-19 DIAGNOSIS — R49.9 VOICE DISTURBANCE: ICD-10-CM

## 2017-05-19 DIAGNOSIS — J38.01 UNILATERAL COMPLETE VOCAL FOLD PARALYSIS: Primary | ICD-10-CM

## 2017-05-19 PROCEDURE — 99024 POSTOP FOLLOW-UP VISIT: CPT | Mod: ,,, | Performed by: OTOLARYNGOLOGY

## 2017-05-19 PROCEDURE — 99213 OFFICE O/P EST LOW 20 MIN: CPT | Mod: PBBFAC | Performed by: OTOLARYNGOLOGY

## 2017-05-19 PROCEDURE — 99999 PR PBB SHADOW E&M-EST. PATIENT-LVL III: CPT | Mod: PBBFAC,,, | Performed by: OTOLARYNGOLOGY

## 2017-05-19 NOTE — MR AVS SNAPSHOT
[facility]  [address]  [facility] [address]  Phone: [phone]  Fax: [phone]                  Umer Sheehan   2017 9:40 AM   Office Visit    Description:  Male : 1960   Provider:  Neal Cota MD   Department:  [facility]           Reason for Visit     Post-op Evaluation           Diagnoses this Visit        Comments    Unilateral complete vocal fold paralysis    -  Primary     Voice disturbance         Hyperfunctional dysphonia                To Do List           Future Appointments        Provider Department Dept Phone    2017 9:40 AM Neal Cota MD [facility] [phone]      Goals (5 Years of Data)     None      Follow-Up and Disposition     Return in about 6 weeks (around 2017).      [facility]     [facility] On Call Nurse Care Line - 24/ Assistance  Unless otherwise directed by your provider, please contact [facility] On-Call, our nurse care line that is available for / assistance.     Registered nurses in the [facility] On Call Center provide: appointment scheduling, clinical advisement, health education, and other advisory services.  Call: [phone] (toll free)               Medications           Message regarding Medications     Verify the changes and/or additions to your medication regime listed below are the same as discussed with your clinician today.  If any of these changes or additions are incorrect, please notify your healthcare provider.             Verify that the below list of medications is an accurate representation of the medications you are currently taking.  If none reported, the list may be blank. If incorrect, please contact your healthcare provider. Carry this list with you in case of emergency.           Current Medications     CALCIUM CARBONATE/VITAMIN D3 (VITAMIN D-3 ORAL) Take by mouth once daily.    DICYCLOMINE HCL (BENTYL ORAL) Take by mouth 3  (three) times daily with meals.    ergocalciferol (VITAMIN D2) 50,000 unit Cap Take 50,000 Units by mouth every 7 days.    hydrocodone-acetaminophen 5-325mg (NORCO) 5-325 mg per tablet Take 1 tablet by mouth every 6 (six) hours as needed for Pain.    levothyroxine (SYNTHROID) 125 MCG tablet Take 125 mcg by mouth once daily.    levothyroxine (SYNTHROID) 25 MCG tablet Take 25 mcg by mouth once daily.    methylPREDNISolone (MEDROL DOSEPACK) 4 mg tablet use as directed    ondansetron (ZOFRAN-ODT) 8 MG TbDL Take 1 tablet (8 mg total) by mouth every 12 (twelve) hours as needed.    pantoprazole (PROTONIX) 20 MG tablet Take 20 mg by mouth once daily.    pantoprazole (PROTONIX) 40 MG tablet Take 40 mg by mouth.           Clinical Reference Information           Your Vitals Were     BP Pulse Temp Weight BMI    136/90 76 97.8 °F (36.6 °C) 121.1 kg (266 lb 15.6 oz) 33.37 kg/m2      Blood Pressure          Most Recent Value    BP  (!)  136/90      Allergies as of 5/19/2017     Doxycycline      Immunizations Administered on Date of Encounter - 5/19/2017     None      [facility] Sign-Up     Activating your [facility] account is as easy as 1-2-3!     1) Visit [email or web address], select Sign Up Now, enter this activation code and your date of birth, then select Next.  [email or web address]  Expires: 7/3/2017  9:30 AM      2) Create a username and password to use when you visit [facility] in the future and select a security question in case you lose your password and select Next.    3) Enter your e-mail address and click Sign Up!    Additional Information  If you have questions, please e-mail [email or web address] or call [phone] to talk to our [facility] staff. Remember, [facility] is NOT to be used for urgent needs. For medical emergencies, dial 911.         Instructions    Call with questions       Language Assistance Services     ATTENTION: Language assistance services are available, free of charge. Please call [phone].       ATENCIÓN: Si habla español, tiene a hendrickson disposición servicios gratuitos de asistencia lingüística. Karen al [phone].     CHÚ Ý: N?u b?n nói Ti?ng Vi?t, có các d?ch v? h? tr? ngôn ng? mi?n phí dành cho b?n. G?i s? [phone].         [facility] complies with applicable Federal civil rights laws and does not discriminate on the basis of race, color, national origin, age, disability, or sex.

## 2017-05-22 NOTE — PROGRESS NOTES
[facility]  Department of Otorhinolaryngology and Communication Sciences    Subjective:      Umer Sheehan is a 56 y.o. male who presents for follow-up. He has right vocal fold immobility, onset of symptoms 6/2016 following total thyroidectomy for non-malignancy. His treatment history is below.    He returns 2 weeks following thyroplasty. He has done very well. He denies pain. He has been back to preaching. He, his family, his friends, and the Religious all report major improvements in his voice. It is very close to how it was prior to his thyroid surgery. He has noted improvements in his singing range. He has had some mild swelling at the incision site which is improving. This also happened following his thyroid surgery. He is very pleased with his progress and is without any complaints.    TREATMENT HISTORY:  1. 7/29/2016 - right vocal fold injection augmentation - restylane-L  2. 11/18/2016 - right vocal fold injection augmentation - restylane-L  3. 5/2/2017 - Right medialization laryngoplasty - silastic    The patient's medications, allergies, past medical, surgical, social and family histories were reviewed and updated as appropriate.    A detailed review of systems was obtained with pertinent positives as per the above HPI, and otherwise negative.        Objective:     BP (!) 136/90   Pulse 76   Temp 97.8 °F (36.6 °C)   Wt 121.1 kg (266 lb 15.6 oz)   BMI 33.37 kg/m²      Constitutional: comfortable, well dressed  Psychiatric: appropriate affect  Respiratory: comfortably breathing, symmetric chest rise, no stridor  Voice: trace, inconsistent roughness; marked improvements across all parameters  Head: normocephalic  Eyes: conjunctivae and sclerae clear  Neck: incision intact; mild edema of the sub-platysmal region without any fluctuance/warmth/erythema; no evidence of fluid collection  Indirect laryngoscopy is limited due to gag    Procedure  Flexible Laryngeal Videostroboscopy (06278):  Laryngeal videostroboscopy is indicated to assess laryngeal vibratory biomechanics and vocal fold oscillation, which cannot be assessed with a plain light examination. This was carried out transnasally with a distal chip videoendoscope. After verbal consent was obtained, the patient was positioned and the nose was topically decongested with 1% phenylephrine and topically anesthetized with 4% lidocaine. The endoscope was passed through the most patent nasal cavity and positioned to image the nasopharynx, larynx, and hypopharynx in detail. The following featured were examined: nasopharyngeal, laryngeal, hypopharyngeal masses; velopharyngeal strength, closure, and symmetry of motion; vocal fold range and symmetry of motion; laryngeal mucosal edema, erythema, inflammation, and hydration; salivary pooling; and gross laryngeal sensation. During phonation, the vocal folds were assesses for glottal closure; mucosal wave; vocal fold lesions; vibratory periodicity, amplitude, and phase symmetry; and vertical height match. The equipment was removed. The patient tolerated the procedure well without complication. All findings were normal except:  - right vocal fold immobile, midline; interval improvement in contour/support; implant in appropriate position  - complete glottal closure  - negligible posterior gap, negligible vertical height mismatch  - interval near-resolution of supraglottic hyperfunction/spasm      Assessment:     Umer Sheehan is a 56 y.o. male with right vocal fold paralysis, following thyroid surgery in 6/2016. He is doing very well following thyroplasty.     Plan:     Reassurance was provided. He will follow up in 6 weeks, or sooner if needed.    Neal Cota M.D.  [facility]  Department of Otorhinolaryngology and Communication Sciences

## 2017-06-19 ENCOUNTER — OFFICE VISIT (OUTPATIENT)
Dept: OTOLARYNGOLOGY | Facility: CLINIC | Age: 57
End: 2017-06-19
Payer: COMMERCIAL

## 2017-06-19 VITALS
BODY MASS INDEX: 33.31 KG/M2 | DIASTOLIC BLOOD PRESSURE: 83 MMHG | HEART RATE: 88 BPM | TEMPERATURE: 98 F | SYSTOLIC BLOOD PRESSURE: 125 MMHG | WEIGHT: 266.56 LBS

## 2017-06-19 DIAGNOSIS — R49.9 VOICE DISTURBANCE: ICD-10-CM

## 2017-06-19 DIAGNOSIS — J38.01 UNILATERAL COMPLETE VOCAL FOLD PARALYSIS: Primary | ICD-10-CM

## 2017-06-19 PROCEDURE — 99213 OFFICE O/P EST LOW 20 MIN: CPT | Mod: PBBFAC | Performed by: OTOLARYNGOLOGY

## 2017-06-19 PROCEDURE — 99999 PR PBB SHADOW E&M-EST. PATIENT-LVL III: CPT | Mod: PBBFAC,,, | Performed by: OTOLARYNGOLOGY

## 2017-06-19 PROCEDURE — 99024 POSTOP FOLLOW-UP VISIT: CPT | Mod: ,,, | Performed by: OTOLARYNGOLOGY

## 2017-06-19 NOTE — PROGRESS NOTES
[facility]  Department of Otorhinolaryngology and Communication Sciences    Subjective:      Umer Sheehan is a 56 y.o. male who presents for follow-up. He has right vocal fold immobility, onset of symptoms 6/2016 following total thyroidectomy for non-malignancy. His treatment history is below.    He returns following thyroplasty  In 5/2017. He has done very well. He continues to report major improvements in his voice. It is very close to how it was prior to his thyroid surgery. He has noted improvements in his singing range. He has even noted continued improvements over the last couple of weeks. He is very pleased with his progress and is without any complaints. He does plan [facility].    TREATMENT HISTORY:  1. 7/29/2016 - right vocal fold injection augmentation - restylane-L  2. 11/18/2016 - right vocal fold injection augmentation - restylane-L  3. 5/2/2017 - Right medialization laryngoplasty - silastic    The patient's medications, allergies, past medical, surgical, social and family histories were reviewed and updated as appropriate.    A detailed review of systems was obtained with pertinent positives as per the above HPI, and otherwise negative.        Objective:     There were no vitals taken for this visit.     Constitutional: comfortable, well dressed  Psychiatric: appropriate affect  Respiratory: comfortably breathing, symmetric chest rise, no stridor  Voice: trace, inconsistent roughness; marked improvements across all parameters  Head: normocephalic  Eyes: conjunctivae and sclerae clear  Neck: incision intact; decreasing edema of the sub-platysmal region without any fluctuance/warmth/erythema; no evidence of fluid collection  Indirect laryngoscopy is limited due to gag      Assessment:     Umer Sheehan is a 56 y.o. male with right vocal fold paralysis, following thyroid surgery in 6/2016. He is doing very well following thyroplasty in  5/2017.     Plan:     Reassurance was provided. I do think he would benefit from SLP voice evaluation with subsequent voice therapy sessions. He will arrange [facility].    I recommended that he use ample sunscreen and keep his incision out of direct sunlight. He will follow up with me in the future on as as-needed basis.     Neal Cota M.D.  [facility]  Department of Otorhinolaryngology and Communication Sciences

## 2017-06-20 PROBLEM — F44.4 HYPERFUNCTIONAL DYSPHONIA: Status: RESOLVED | Noted: 2017-04-03 | Resolved: 2017-06-20

## 2018-02-22 ENCOUNTER — DOCUMENTATION ONLY (OUTPATIENT)
Dept: OTOLARYNGOLOGY | Facility: CLINIC | Age: 58
End: 2018-02-22

## 2018-02-22 NOTE — PROGRESS NOTES
Spoke with Dr. Arango and with pt.    Right sided variable neck discomfort and globus. Some variability to his voice.     Impression: uncertain etiology; MTD vs. Expected course vs. Implant migration/instablity/extrusion vs. Cartilage fx    Dr. Arango saw pt today. Endolaryngeal exam was reassuring. Neck was benign.    Will work with ST close to home. Will keep me informed of his progress. Should he not improve, consider empiric treatment with abx. If still not improving, recommend I evaluate him myself.

## 2018-03-26 ENCOUNTER — DOCUMENTATION ONLY (OUTPATIENT)
Dept: OTOLARYNGOLOGY | Facility: CLINIC | Age: 58
End: 2018-03-26

## 2018-03-26 ENCOUNTER — TELEPHONE (OUTPATIENT)
Dept: OTOLARYNGOLOGY | Facility: CLINIC | Age: 58
End: 2018-03-26

## 2018-03-26 NOTE — PROGRESS NOTES
Spoke with patient. Has been undergoing voice therapy and has made great progress. He is very pleased with the results.    Notified me he expects to need to undergo transsphenoidal pituitary surgery. Recommended use of a size 6.0 ETT. Surgeon or anesthesia team should feel free to reach out to me with any larynx or airway-related concerns.     All questions answered, patient in agreement.

## 2018-03-26 NOTE — TELEPHONE ENCOUNTER
----- Message from Reshma Herminia sent at 3/26/2018 12:20 PM CDT -----  Contact: pt at [phone]  [id number] has outisde impending surgery and needs to know if the drugs and care with impact vocal cords.  Pt will explain more when you call.  Something about his pituitary gland

## 2018-04-03 ENCOUNTER — TELEPHONE (OUTPATIENT)
Dept: OTOLARYNGOLOGY | Facility: CLINIC | Age: 58
End: 2018-04-03

## 2018-04-03 NOTE — TELEPHONE ENCOUNTER
----- Message from Rosio Galvin sent at 4/2/2018  8:36 AM CDT -----  Contact: patient  Please call above patient need to speak with the nurse said medication is affecting his voice that the doctor put him on waiting on a call back

## 2019-01-02 ENCOUNTER — TELEPHONE (OUTPATIENT)
Dept: OTOLARYNGOLOGY | Facility: CLINIC | Age: 59
End: 2019-01-02

## 2019-01-02 NOTE — TELEPHONE ENCOUNTER
----- Message from Zeyad Dawkins sent at 1/2/2019  9:22 AM CST -----  Contact: [phone]  Needs Advice    Reason for call:patient requesting return call at soonest convenience, states that it is an urgent matter and declined to go further into detail when inquired.         Communication Preference:[phone]    Additional Information:

## 2019-01-10 ENCOUNTER — TELEPHONE (OUTPATIENT)
Dept: DERMATOLOGY | Facility: CLINIC | Age: 59
End: 2019-01-10

## 2019-01-10 NOTE — TELEPHONE ENCOUNTER
----- Message from Alesha Felix sent at 1/10/2019 11:49 AM CST -----  Contact: Patient   Patient Requesting Sooner Appointment.     Reason for sooner appt.: Patient states that he has two uncomfortable sebaceous cysts that he would like to have removed    When is the first available appointment? Unknwn    Communication Preference: [phone]

## 2019-01-18 ENCOUNTER — TELEPHONE (OUTPATIENT)
Dept: OTOLARYNGOLOGY | Facility: CLINIC | Age: 59
End: 2019-01-18

## 2019-01-18 NOTE — TELEPHONE ENCOUNTER
----- Message from Mora Watts sent at 1/18/2019  9:10 AM CST -----  Contact: pt   Needs Advice    Reason for call: pt is calling to speak with the nurse about his appt         Communication Preference: can you please call pt at [phone]    Additional Information: none    JENANETTE

## 2019-01-21 ENCOUNTER — OFFICE VISIT (OUTPATIENT)
Dept: OTOLARYNGOLOGY | Facility: CLINIC | Age: 59
End: 2019-01-21
Payer: OTHER GOVERNMENT

## 2019-01-21 VITALS
DIASTOLIC BLOOD PRESSURE: 85 MMHG | WEIGHT: 248.88 LBS | SYSTOLIC BLOOD PRESSURE: 134 MMHG | HEART RATE: 74 BPM | BODY MASS INDEX: 31.11 KG/M2

## 2019-01-21 DIAGNOSIS — J38.01 UNILATERAL COMPLETE VOCAL FOLD PARALYSIS: ICD-10-CM

## 2019-01-21 DIAGNOSIS — R49.0 DYSPHONIA: Primary | ICD-10-CM

## 2019-01-21 PROCEDURE — 31579 LARYNGOSCOPY TELESCOPIC: CPT | Mod: S$PBB,,, | Performed by: OTOLARYNGOLOGY

## 2019-01-21 PROCEDURE — 31579 LARYNGOSCOPY TELESCOPIC: CPT | Mod: PBBFAC | Performed by: OTOLARYNGOLOGY

## 2019-01-21 PROCEDURE — 99214 PR OFFICE/OUTPT VISIT, EST, LEVL IV, 30-39 MIN: ICD-10-PCS | Mod: 25,S$PBB,, | Performed by: OTOLARYNGOLOGY

## 2019-01-21 PROCEDURE — 31579 PR LARYNGOSCOPY, FLEX/RIGID TELESCOPIC, W/STROBOSCOPY: ICD-10-PCS | Mod: S$PBB,,, | Performed by: OTOLARYNGOLOGY

## 2019-01-21 PROCEDURE — 99214 OFFICE O/P EST MOD 30 MIN: CPT | Mod: 25,S$PBB,, | Performed by: OTOLARYNGOLOGY

## 2019-01-21 PROCEDURE — 99999 PR PBB SHADOW E&M-EST. PATIENT-LVL III: CPT | Mod: PBBFAC,,, | Performed by: OTOLARYNGOLOGY

## 2019-01-21 PROCEDURE — 99213 OFFICE O/P EST LOW 20 MIN: CPT | Mod: PBBFAC | Performed by: OTOLARYNGOLOGY

## 2019-01-21 PROCEDURE — 99999 PR PBB SHADOW E&M-EST. PATIENT-LVL III: ICD-10-PCS | Mod: PBBFAC,,, | Performed by: OTOLARYNGOLOGY

## 2019-01-21 NOTE — PROGRESS NOTES
[facility]  Department of Otorhinolaryngology and Communication Sciences    Subjective:      Umer Sheehan is a 58 y.o. male who presents for follow-up. He has right vocal fold immobility, onset of symptoms 6/2016 following total thyroidectomy for non-malignancy. His treatment history is below.    TREATMENT HISTORY:  7/29/2016 - right vocal fold injection augmentation - restylane-L  11/18/2016 - right vocal fold injection augmentation - restylane-L  5/2/2017 - Right medialization laryngoplasty - silastic    He was doing very well from a voice standpoint at the time of his last visit, about 1.5 years ago.     Since his last visit, he underwent transsphenoidal pituitary surgery 4/2018. He felt that his voice got worse afterwards. He reports a massive amount of weight loss (~55 lb) right after surgery. And along the way his voice deteriorated to its present status.     [facility] Due to some ongoing throat discomfort, a CT was obtained. Results are not available for review, but the patient's understanding is that it was unremarkable. Nevertheless, [facility] felt he had developed some glottal insufficiency since the surgery and thought he might benefit from some type of revision thyroplasty. However, [facility] and [facility] team developed some understanding that I was no longer in his network. They eventually referred him to Dr. Marcus in [place], who evaluated the patient and told him that, while he could revise the thyroplasty, he was not able to do an arytenoid procedure if necessary. At the same time, he thought he may be able to help Mr. Sheehan with vocal fold injection augmentation.     He underwent injection augmentation on 10/1/2018. There is no op note available for review. The patient does not know what injectate was used. This provided no benefit to him and, in fact, may have made his voice worse. He saw Dr. Marcus again; he was told he needs an arytenoid procedure.  Mr. Sheehan contacted us for a referral to someone who can do this. We assured him that I could perform this procedure if necessary and also ascertained that I am in fact not out-of-network for him. I recommended I evaluate him again myself.    He is on trazodone for esophageal spasms/anxiety/insomnia.    The patient is frustrated and eager for improvement in his voice. When he does any kind of exertion, he reports he wheezes a bit.     The patient's medications, allergies, past medical, surgical, social and family histories were reviewed and updated as appropriate.    A detailed review of systems was obtained with pertinent positives as per the above HPI, and otherwise negative.        Objective:     /85 (Patient Position: Sitting)   Pulse 74   Wt 112.9 kg (248 lb 14.4 oz)   BMI 31.11 kg/m²      Constitutional: comfortable, well dressed  Psychiatric: appropriate affect  Respiratory: comfortably breathing, symmetric chest rise, no stridor  Voice: pressed, strained; impaired flexibility  Head: normocephalic  Eyes: conjunctivae and sclerae clear  Neck: incision intact; mild edema of the sub-platysmal region without any fluctuance/warmth/erythema; no evidence of fluid collection  Indirect laryngoscopy is limited due to gag    Procedure  Flexible Laryngeal Videostroboscopy (30390): Laryngeal videostroboscopy is indicated to assess laryngeal vibratory biomechanics and vocal fold oscillation, which cannot be assessed with a plain light examination. This was carried out transnasally with a distal chip videoendoscope. After verbal consent was obtained, the patient was positioned and the nose was topically decongested with 1% phenylephrine and topically anesthetized with 4% lidocaine. The endoscope was passed through the most patent nasal cavity and positioned to image the nasopharynx, larynx, and hypopharynx in detail. The following featured were examined: nasopharyngeal, laryngeal, hypopharyngeal masses;  velopharyngeal strength, closure, and symmetry of motion; vocal fold range and symmetry of motion; laryngeal mucosal edema, erythema, inflammation, and hydration; salivary pooling; and gross laryngeal sensation. During phonation, the vocal folds were assesses for glottal closure; mucosal wave; vocal fold lesions; vibratory periodicity, amplitude, and phase symmetry; and vertical height match. The equipment was removed. The patient tolerated the procedure well without complication. All findings were normal except:  - right vocal fold immobile, midline; interval development of over-convexity of free edge; pale linear streak along superior surface  - left vocal fold with interval development of convexity  - glottal overclosure; primarily aperiodic vibration and truncated phonation time  - negligible posterior gap, negligible vertical height mismatch      Assessment:     Umer Sheehan is a 58 y.o. male with right vocal fold paralysis, following thyroid surgery in 6/2016, s/p thyroplasty 5/2017. He is more recently s/p pituitary surgery and over 50 lb weight loss, followed by some type of vocal fold injection augmentation.    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 DDx is broad, including but not limited to implant migration/displacement and lingering effects of recent injection augmentation.     Plan:     At present, I would recommend against revision thyroplasty, as well as against arytenoid adduction. Nevertheless, it is unclear to me what may be required to rehabilitate his voice. I asked him to provide me with the operative note from October's injection augmentation, as well as the CT imaging obtained prior to this.    I will review this information and then contact the patient with what I think is the best next step.    All questions were answered, and the patient is in agreement with the above.    This visit was 25 minutes in duration, with over 50% of the time spent in direct face-to-face  counseling and coordination of care regarding the issues outlined above.    Neal Cota M.D.  [facility]  Department of Otorhinolaryngology and Communication Sciences

## 2019-01-21 NOTE — H&P (VIEW-ONLY)
[facility]  Department of Otorhinolaryngology and Communication Sciences    Subjective:      Umer Sheehan is a 58 y.o. male who presents for follow-up. He has right vocal fold immobility, onset of symptoms 6/2016 following total thyroidectomy for non-malignancy. His treatment history is below.    TREATMENT HISTORY:  7/29/2016 - right vocal fold injection augmentation - restylane-L  11/18/2016 - right vocal fold injection augmentation - restylane-L  5/2/2017 - Right medialization laryngoplasty - silastic    He was doing very well from a voice standpoint at the time of his last visit, about 1.5 years ago.     Since his last visit, he underwent transsphenoidal pituitary surgery 4/2018. He felt that his voice got worse afterwards. He reports a massive amount of weight loss (~55 lb) right after surgery. And along the way his voice deteriorated to its present status.     [facility] Due to some ongoing throat discomfort, a CT was obtained. Results are not available for review, but the patient's understanding is that it was unremarkable. Nevertheless, his [facility] doctor felt he had developed some glottal insufficiency since the surgery and thought he might benefit from some type of revision thyroplasty. However, Mr. Sheehan and his [facility] team developed some understanding that I was no longer in his network. They eventually referred him to Dr. Marcus in [place], who evaluated the patient and told him that, while he could revise the thyroplasty, he was not able to do an arytenoid procedure if necessary. At the same time, he thought he may be able to help Mr. Sheehan with vocal fold injection augmentation.     He underwent injection augmentation on 10/1/2018. There is no op note available for review. The patient does not know what injectate was used. This provided no benefit to him and, in fact, may have made his voice worse. He saw Dr. Marcus again; he was told he needs an arytenoid procedure.  Mr. Sheehan contacted us for a referral to someone who can do this. We assured him that I could perform this procedure if necessary and also ascertained that I am in fact not out-of-network for him. I recommended I evaluate him again myself.    He is on trazodone for esophageal spasms/anxiety/insomnia.    The patient is frustrated and eager for improvement in his voice. When he does any kind of exertion, he reports he wheezes a bit.     The patient's medications, allergies, past medical, surgical, social and family histories were reviewed and updated as appropriate.    A detailed review of systems was obtained with pertinent positives as per the above HPI, and otherwise negative.        Objective:     /85 (Patient Position: Sitting)   Pulse 74   Wt 112.9 kg (248 lb 14.4 oz)   BMI 31.11 kg/m²      Constitutional: comfortable, well dressed  Psychiatric: appropriate affect  Respiratory: comfortably breathing, symmetric chest rise, no stridor  Voice: pressed, strained; impaired flexibility  Head: normocephalic  Eyes: conjunctivae and sclerae clear  Neck: incision intact; mild edema of the sub-platysmal region without any fluctuance/warmth/erythema; no evidence of fluid collection  Indirect laryngoscopy is limited due to gag    Procedure  Flexible Laryngeal Videostroboscopy (08775): Laryngeal videostroboscopy is indicated to assess laryngeal vibratory biomechanics and vocal fold oscillation, which cannot be assessed with a plain light examination. This was carried out transnasally with a distal chip videoendoscope. After verbal consent was obtained, the patient was positioned and the nose was topically decongested with 1% phenylephrine and topically anesthetized with 4% lidocaine. The endoscope was passed through the most patent nasal cavity and positioned to image the nasopharynx, larynx, and hypopharynx in detail. The following featured were examined: nasopharyngeal, laryngeal, hypopharyngeal masses;  velopharyngeal strength, closure, and symmetry of motion; vocal fold range and symmetry of motion; laryngeal mucosal edema, erythema, inflammation, and hydration; salivary pooling; and gross laryngeal sensation. During phonation, the vocal folds were assesses for glottal closure; mucosal wave; vocal fold lesions; vibratory periodicity, amplitude, and phase symmetry; and vertical height match. The equipment was removed. The patient tolerated the procedure well without complication. All findings were normal except:  - right vocal fold immobile, midline; interval development of over-convexity of free edge; pale linear streak along superior surface  - left vocal fold with interval development of convexity  - glottal overclosure; primarily aperiodic vibration and truncated phonation time  - negligible posterior gap, negligible vertical height mismatch      Assessment:     Umer Sheehan is a 58 y.o. male with right vocal fold paralysis, following thyroid surgery in 6/2016, s/p thyroplasty 5/2017. He is more recently s/p pituitary surgery and over 50 lb weight loss, followed by some type of vocal fold injection augmentation.    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 DDx is broad, including but not limited to implant migration/displacement and lingering effects of recent injection augmentation.     Plan:     At present, I would recommend against revision thyroplasty, as well as against arytenoid adduction. Nevertheless, it is unclear to me what may be required to rehabilitate his voice. I asked him to provide me with the operative note from October's injection augmentation, as well as the CT imaging obtained prior to this.    I will review this information and then contact the patient with what I think is the best next step.    All questions were answered, and the patient is in agreement with the above.    This visit was 25 minutes in duration, with over 50% of the time spent in direct face-to-face  counseling and coordination of care regarding the issues outlined above.    Neal Cota M.D.  [facility]  Department of Otorhinolaryngology and Communication Sciences

## 2019-01-22 ENCOUNTER — TELEPHONE (OUTPATIENT)
Dept: OTOLARYNGOLOGY | Facility: CLINIC | Age: 59
End: 2019-01-22

## 2019-01-22 NOTE — TELEPHONE ENCOUNTER
----- Message from Rosio Galvin sent at 1/22/2019  9:50 AM CST -----  Contact: patient  Please call above patient at [phone] need to speak with the nurse waiting on a call back thanks

## 2019-01-31 DIAGNOSIS — R49.9 VOICE DISTURBANCE: ICD-10-CM

## 2019-01-31 DIAGNOSIS — J38.01 UNILATERAL COMPLETE VOCAL FOLD PARALYSIS: Primary | ICD-10-CM

## 2019-01-31 RX ORDER — LIDOCAINE HYDROCHLORIDE 10 MG/ML
1 INJECTION, SOLUTION EPIDURAL; INFILTRATION; INTRACAUDAL; PERINEURAL ONCE
Status: CANCELLED | OUTPATIENT
Start: 2019-01-31 | End: 2019-01-31

## 2019-01-31 RX ORDER — DEXAMETHASONE SODIUM PHOSPHATE 4 MG/ML
8 INJECTION, SOLUTION INTRA-ARTICULAR; INTRALESIONAL; INTRAMUSCULAR; INTRAVENOUS; SOFT TISSUE ONCE
Status: CANCELLED | OUTPATIENT
Start: 2019-01-31 | End: 2019-01-31

## 2019-02-01 ENCOUNTER — TELEPHONE (OUTPATIENT)
Dept: OTOLARYNGOLOGY | Facility: CLINIC | Age: 59
End: 2019-02-01

## 2019-02-01 DIAGNOSIS — Z01.818 PREOP TESTING: Primary | ICD-10-CM

## 2019-02-01 NOTE — TELEPHONE ENCOUNTER
----- Message from Neal Cota MD sent at 1/31/2019 12:01 PM CST -----  Regarding: surgery date  I dropped in his case for feb 12th  Tell him to plan to stay overnight, DC in the morning afterwards

## 2019-02-04 ENCOUNTER — TELEPHONE (OUTPATIENT)
Dept: OTOLARYNGOLOGY | Facility: CLINIC | Age: 59
End: 2019-02-04

## 2019-02-04 NOTE — TELEPHONE ENCOUNTER
----- Message from Mora Watts sent at 2/4/2019  8:20 AM CST -----  Contact: pt   Needs Advice    Reason for call: pt is calling to speak with the nurse pt said they need to send a second authorization code for the pt surgery that he is suppose to be having pt said they need to get the right authorization codes pt said the nurses have to send that in pt is having surgery on 2/12/2019        Communication Preference: can you please call pt at [phone]    Additional Information: none    JEANNETTE

## 2019-02-06 ENCOUNTER — ANESTHESIA EVENT (OUTPATIENT)
Dept: SURGERY | Facility: HOSPITAL | Age: 59
End: 2019-02-06
Payer: COMMERCIAL

## 2019-02-06 NOTE — PRE ADMISSION SCREENING
Anesthesia Assessment: Preoperative EQUATION    Planned Procedure: Procedure(s) (LRB):  THYROPLASTY - removal of implant (Right)  Requested Anesthesia Type:General  Surgeon: Neal Cota MD  Service: ENT  Known or anticipated Date of Surgery:2/12/2019    Surgeon notes: reviewed    Electronic QUestionnaire Assessment completed via nurse interview with patient.      NO AQ    Triage considerations:     The patient has no apparent active cardiac condition (No unstable coronary Syndrome such as severe unstable angina or recent [<1 month] myocardial infarction, decompensated CHF, severe valvular   disease or significant arrhythmia)    Previous anesthesia records:GETA, MAC and No problems   05/02/2017  Anesthesia Hx:  No problems with previous Anesthesia  History of prior surgery of interest to airway management or planning: Denies Family Hx of Anesthesia complications.   Denies Personal Hx of Anesthesia complications  Airway/Jaw/Neck:  Airway Findings: Mouth Opening: Normal Tongue: Normal  General Airway Assessment: Adult  Mallampati: III  TM Distance: Normal, at least 6 cm        Last PCP note: outside [facility]   Subspecialty notes: ENT    Other important co-morbidities:  Obesity, GERD, CHHAYA/CPAP, Hypothyroid, HLD, Arthritis, Vocal cord paralysis     Tests already available:  No recent tests.            Instructions given. (See in Nurse's note)    Optimization:  Anesthesia Preop Clinic Assessment  Indicated-not required for this surgery        Plan:    Testing:  BMP     Patient  has previously scheduled Medical Appointment: none    Navigation: Tests Scheduled. [facility] DOS 2/12            Straight Line to surgery.      Mary Kay Tidwell RN

## 2019-02-06 NOTE — ANESTHESIA PREPROCEDURE EVALUATION
Anesthesia Assessment: Preoperative EQUATION     Planned Procedure: Procedure(s) (LRB):  THYROPLASTY - removal of implant (Right)  Requested Anesthesia Type:General  Surgeon: Neal Cota MD  Service: ENT  Known or anticipated Date of Surgery:2/12/2019     Surgeon notes: reviewed     Electronic QUestionnaire Assessment completed via nurse interview with patient.      NO AQ     Triage considerations:      The patient has no apparent active cardiac condition (No unstable coronary Syndrome such as severe unstable angina or recent [<1 month] myocardial infarction, decompensated CHF, severe valvular   disease or significant arrhythmia)     Previous anesthesia records:GETA, MAC and No problems   05/02/2017  Anesthesia Hx:  No problems with previous Anesthesia  History of prior surgery of interest to airway management or planning: Denies Family Hx of Anesthesia complications.   Denies Personal Hx of Anesthesia complications  Airway/Jaw/Neck:  Airway Findings: Mouth Opening: Normal Tongue: Normal  General Airway Assessment: Adult  Mallampati: III  TM Distance: Normal, at least 6 cm         Last PCP note: outside [facility]   Subspecialty notes: ENT     Other important co-morbidities:  Obesity, GERD, CHHAYA/CPAP, Hypothyroid, HLD, Arthritis, Vocal cord paralysis     Tests already available:  No recent tests.                            Instructions given. (See in Nurse's note)     Optimization:  Anesthesia Preop Clinic Assessment  Indicated-not required for this surgery                Plan:    Testing:  BMP                           Patient  has previously scheduled Medical Appointment: none     Navigation: Tests Scheduled. BMP DOS 2/12                       Straight Line to surgery.       Mary Kay Tidwell RN                           Electronically signed by Mary Kay Tidwell RN at 2/6/2019 11:03 AM                                                                                                               02/06/2019  Umer Sheehan is a 58 y.o., male.    Anesthesia Evaluation    I have reviewed the Patient Summary Reports.    I have reviewed the Nursing Notes.   I have reviewed the Medications.     Review of Systems  Anesthesia Hx:  No problems with previous Anesthesia  History of prior surgery of interest to airway management or planning: Denies Family Hx of Anesthesia complications.   Denies Personal Hx of Anesthesia complications.   Social:  Non-Smoker    Hematology/Oncology:  Hematology Normal   Oncology Normal     EENT/Dental:  EENT/Dental Normal Throat Disease: Vocal Cord Paralysis    Cardiovascular:  Cardiovascular Normal Exercise tolerance: good  ECG has been reviewed.    Pulmonary:   Sleep Apnea  Obstructive Sleep Apnea (CHHAYA), CPAP used.   Renal/:  Renal/ Normal     Hepatic/GI:   GERD, well controlled  Esophageal / Stomach Disorders Gerd Controlled by chronic antireflux medication.    Musculoskeletal:  Musculoskeletal Normal  Joint Disease:  Arthritis    Neurological:  Neurology Normal    Endocrine:   Hypothyroidism    Dermatological:  Skin Normal    Psych:  Psychiatric Normal           Physical Exam  General:  Well nourished, Obesity    Airway/Jaw/Neck:  Airway Findings: Mouth Opening: Normal Tongue: Normal  General Airway Assessment: Adult  Mallampati: II  TM Distance: Normal, at least 6 cm        Eyes/Ears/Nose:  EYES/EARS/NOSE FINDINGS: Normal   Dental:  DENTAL FINDINGS: Normal   Chest/Lungs:  Chest/Lungs Clear    Heart/Vascular:  Heart Findings: Normal Heart murmur: negative Vascular Findings: Normal    Abdomen:  Abdomen Findings: Normal    Musculoskeletal:  Musculoskeletal Findings: Normal   Skin:  Skin Findings: Normal    Mental Status:  Mental Status Findings: Normal        Anesthesia Plan  Type of Anesthesia, risks & benefits discussed:  Anesthesia Type:  general  Patient's Preference:   Intra-op Monitoring Plan: standard ASA monitors  Intra-op Monitoring Plan Comments:   Post Op Pain Control  Plan: per primary service following discharge from PACU and IV/PO Opioids PRN  Post Op Pain Control Plan Comments:   Induction:   IV  Beta Blocker:  Patient is not currently on a Beta-Blocker (No further documentation required).       Informed Consent: Patient understands risks and agrees with Anesthesia plan.  Questions answered. Anesthesia consent signed with patient.  ASA Score: 2     Day of Surgery Review of History & Physical:    H&P update referred to the surgeon.     Anesthesia Plan Notes: Uses Bipap at home, unsure of settings.         Ready For Surgery From Anesthesia Perspective.

## 2019-02-11 ENCOUNTER — TELEPHONE (OUTPATIENT)
Dept: OTOLARYNGOLOGY | Facility: CLINIC | Age: 59
End: 2019-02-11

## 2019-02-11 NOTE — TELEPHONE ENCOUNTER
----- Message from Rosio Galvin sent at 2/11/2019  9:17 AM CST -----  Contact: [facility]  Please call samreen armas wants to know if surgery will be  In patient or out patient surgery call this number  [phone] ms. Xiong

## 2019-02-11 NOTE — TELEPHONE ENCOUNTER
Spoke to Ela. Verified 23hr Obs.  Auth number obtain for surgery chiqui, 2/12/19 with CPT code 44510.

## 2019-02-11 NOTE — TELEPHONE ENCOUNTER
----- Message from Rosio Galvin sent at 2/11/2019  8:36 AM CST -----  Contact: patient  Please call above patient at [phone] need to speak with the nurse about authorization for surgery waiting on a call back thanks

## 2019-02-12 ENCOUNTER — HOSPITAL ENCOUNTER (OUTPATIENT)
Facility: HOSPITAL | Age: 59
Discharge: HOME | End: 2019-02-13
Attending: OTOLARYNGOLOGY | Admitting: OTOLARYNGOLOGY
Payer: COMMERCIAL

## 2019-02-12 ENCOUNTER — ANESTHESIA (OUTPATIENT)
Dept: SURGERY | Facility: HOSPITAL | Age: 59
End: 2019-02-12
Payer: COMMERCIAL

## 2019-02-12 DIAGNOSIS — R49.9 VOICE DISTURBANCE: Primary | ICD-10-CM

## 2019-02-12 DIAGNOSIS — J38.01 UNILATERAL COMPLETE VOCAL FOLD PARALYSIS: ICD-10-CM

## 2019-02-12 DIAGNOSIS — Z01.818 PREOP TESTING: ICD-10-CM

## 2019-02-12 LAB
ANION GAP SERPL CALC-SCNC: 8 MMOL/L
BUN SERPL-MCNC: 19 MG/DL
CALCIUM SERPL-MCNC: 9.4 MG/DL
CHLORIDE SERPL-SCNC: 107 MMOL/L
CO2 SERPL-SCNC: 24 MMOL/L
CREAT SERPL-MCNC: 1.3 MG/DL
EST. GFR  (AFRICAN AMERICAN): >60 ML/MIN/1.73 M^2
EST. GFR  (NON AFRICAN AMERICAN): 59.3 ML/MIN/1.73 M^2
GLUCOSE SERPL-MCNC: 89 MG/DL
POTASSIUM SERPL-SCNC: 4.3 MMOL/L
SODIUM SERPL-SCNC: 139 MMOL/L

## 2019-02-12 PROCEDURE — 63600175 PHARM REV CODE 636 W HCPCS: Performed by: STUDENT IN AN ORGANIZED HEALTH CARE EDUCATION/TRAINING PROGRAM

## 2019-02-12 PROCEDURE — 63600175 PHARM REV CODE 636 W HCPCS: Performed by: OTOLARYNGOLOGY

## 2019-02-12 PROCEDURE — 71000039 HC RECOVERY, EACH ADD'L HOUR: Performed by: OTOLARYNGOLOGY

## 2019-02-12 PROCEDURE — 25000003 PHARM REV CODE 250: Performed by: OTOLARYNGOLOGY

## 2019-02-12 PROCEDURE — 25000003 PHARM REV CODE 250: Performed by: ANESTHESIOLOGY

## 2019-02-12 PROCEDURE — D9220A PRA ANESTHESIA: Mod: CRNA,,, | Performed by: NURSE ANESTHETIST, CERTIFIED REGISTERED

## 2019-02-12 PROCEDURE — 31599 UNLISTED PROCEDURE LARYNX: CPT | Mod: ,,, | Performed by: OTOLARYNGOLOGY

## 2019-02-12 PROCEDURE — 27201423 OPTIME MED/SURG SUP & DEVICES STERILE SUPPLY: Performed by: OTOLARYNGOLOGY

## 2019-02-12 PROCEDURE — 71000033 HC RECOVERY, INTIAL HOUR: Performed by: OTOLARYNGOLOGY

## 2019-02-12 PROCEDURE — 94761 N-INVAS EAR/PLS OXIMETRY MLT: CPT | Mod: 59

## 2019-02-12 PROCEDURE — 31599: ICD-10-PCS | Mod: ,,, | Performed by: OTOLARYNGOLOGY

## 2019-02-12 PROCEDURE — 25000003 PHARM REV CODE 250: Performed by: STUDENT IN AN ORGANIZED HEALTH CARE EDUCATION/TRAINING PROGRAM

## 2019-02-12 PROCEDURE — 36000707: Performed by: OTOLARYNGOLOGY

## 2019-02-12 PROCEDURE — D9220A PRA ANESTHESIA: Mod: ANES,,, | Performed by: ANESTHESIOLOGY

## 2019-02-12 PROCEDURE — 63600175 PHARM REV CODE 636 W HCPCS: Performed by: NURSE ANESTHETIST, CERTIFIED REGISTERED

## 2019-02-12 PROCEDURE — 80048 BASIC METABOLIC PNL TOTAL CA: CPT

## 2019-02-12 PROCEDURE — 63600175 PHARM REV CODE 636 W HCPCS: Performed by: ANESTHESIOLOGY

## 2019-02-12 PROCEDURE — D9220A PRA ANESTHESIA: ICD-10-PCS | Mod: ANES,,, | Performed by: ANESTHESIOLOGY

## 2019-02-12 PROCEDURE — 25000003 PHARM REV CODE 250: Performed by: NURSE ANESTHETIST, CERTIFIED REGISTERED

## 2019-02-12 PROCEDURE — 00320 ANES ALL PX NECK NOS 1YR/>: CPT | Performed by: OTOLARYNGOLOGY

## 2019-02-12 PROCEDURE — 37000009 HC ANESTHESIA EA ADD 15 MINS: Performed by: OTOLARYNGOLOGY

## 2019-02-12 PROCEDURE — 37000008 HC ANESTHESIA 1ST 15 MINUTES: Performed by: OTOLARYNGOLOGY

## 2019-02-12 PROCEDURE — D9220A PRA ANESTHESIA: ICD-10-PCS | Mod: CRNA,,, | Performed by: NURSE ANESTHETIST, CERTIFIED REGISTERED

## 2019-02-12 PROCEDURE — 27000221 HC OXYGEN, UP TO 24 HOURS

## 2019-02-12 PROCEDURE — 36000706: Performed by: OTOLARYNGOLOGY

## 2019-02-12 PROCEDURE — C1729 CATH, DRAINAGE: HCPCS | Performed by: OTOLARYNGOLOGY

## 2019-02-12 RX ORDER — MIDAZOLAM HYDROCHLORIDE 1 MG/ML
INJECTION, SOLUTION INTRAMUSCULAR; INTRAVENOUS
Status: DISCONTINUED | OUTPATIENT
Start: 2019-02-12 | End: 2019-02-12

## 2019-02-12 RX ORDER — LIDOCAINE HCL/PF 100 MG/5ML
SYRINGE (ML) INTRAVENOUS
Status: DISCONTINUED | OUTPATIENT
Start: 2019-02-12 | End: 2019-02-12

## 2019-02-12 RX ORDER — PROPOFOL 10 MG/ML
VIAL (ML) INTRAVENOUS
Status: DISCONTINUED | OUTPATIENT
Start: 2019-02-12 | End: 2019-02-12

## 2019-02-12 RX ORDER — SODIUM CHLORIDE 9 MG/ML
INJECTION, SOLUTION INTRAVENOUS CONTINUOUS
Status: DISCONTINUED | OUTPATIENT
Start: 2019-02-12 | End: 2019-02-13

## 2019-02-12 RX ORDER — LEVOTHYROXINE SODIUM 25 UG/1
25 TABLET ORAL
Status: DISCONTINUED | OUTPATIENT
Start: 2019-02-13 | End: 2019-02-13 | Stop reason: HOSPADM

## 2019-02-12 RX ORDER — ONDANSETRON 2 MG/ML
INJECTION INTRAMUSCULAR; INTRAVENOUS
Status: DISCONTINUED | OUTPATIENT
Start: 2019-02-12 | End: 2019-02-12

## 2019-02-12 RX ORDER — ROCURONIUM BROMIDE 10 MG/ML
INJECTION, SOLUTION INTRAVENOUS
Status: DISCONTINUED | OUTPATIENT
Start: 2019-02-12 | End: 2019-02-12

## 2019-02-12 RX ORDER — ACETAMINOPHEN 10 MG/ML
INJECTION, SOLUTION INTRAVENOUS
Status: DISCONTINUED | OUTPATIENT
Start: 2019-02-12 | End: 2019-02-12

## 2019-02-12 RX ORDER — NEOSTIGMINE METHYLSULFATE 1 MG/ML
INJECTION, SOLUTION INTRAVENOUS
Status: DISCONTINUED | OUTPATIENT
Start: 2019-02-12 | End: 2019-02-12

## 2019-02-12 RX ORDER — PANTOPRAZOLE SODIUM 20 MG/1
20 TABLET, DELAYED RELEASE ORAL DAILY
Status: DISCONTINUED | OUTPATIENT
Start: 2019-02-13 | End: 2019-02-13 | Stop reason: HOSPADM

## 2019-02-12 RX ORDER — HYDROCODONE BITARTRATE AND ACETAMINOPHEN 5; 325 MG/1; MG/1
1 TABLET ORAL EVERY 4 HOURS PRN
Status: DISCONTINUED | OUTPATIENT
Start: 2019-02-12 | End: 2019-02-13

## 2019-02-12 RX ORDER — SODIUM CHLORIDE 9 MG/ML
INJECTION, SOLUTION INTRAVENOUS CONTINUOUS
Status: DISCONTINUED | OUTPATIENT
Start: 2019-02-12 | End: 2019-02-12

## 2019-02-12 RX ORDER — MORPHINE SULFATE 2 MG/ML
4 INJECTION, SOLUTION INTRAMUSCULAR; INTRAVENOUS EVERY 4 HOURS PRN
Status: DISCONTINUED | OUTPATIENT
Start: 2019-02-12 | End: 2019-02-13 | Stop reason: HOSPADM

## 2019-02-12 RX ORDER — LIDOCAINE HYDROCHLORIDE 10 MG/ML
1 INJECTION, SOLUTION EPIDURAL; INFILTRATION; INTRACAUDAL; PERINEURAL ONCE
Status: COMPLETED | OUTPATIENT
Start: 2019-02-12 | End: 2019-02-12

## 2019-02-12 RX ORDER — CEFAZOLIN SODIUM 1 G/3ML
2 INJECTION, POWDER, FOR SOLUTION INTRAMUSCULAR; INTRAVENOUS
Status: COMPLETED | OUTPATIENT
Start: 2019-02-12 | End: 2019-02-13

## 2019-02-12 RX ORDER — FENTANYL CITRATE 50 UG/ML
25 INJECTION, SOLUTION INTRAMUSCULAR; INTRAVENOUS EVERY 5 MIN PRN
Status: COMPLETED | OUTPATIENT
Start: 2019-02-12 | End: 2019-02-12

## 2019-02-12 RX ORDER — FENTANYL CITRATE 50 UG/ML
INJECTION, SOLUTION INTRAMUSCULAR; INTRAVENOUS
Status: DISCONTINUED | OUTPATIENT
Start: 2019-02-12 | End: 2019-02-12

## 2019-02-12 RX ORDER — LIDOCAINE HYDROCHLORIDE AND EPINEPHRINE 10; 10 MG/ML; UG/ML
INJECTION, SOLUTION INFILTRATION; PERINEURAL
Status: DISCONTINUED | OUTPATIENT
Start: 2019-02-12 | End: 2019-02-12 | Stop reason: HOSPADM

## 2019-02-12 RX ORDER — ONDANSETRON 8 MG/1
8 TABLET, ORALLY DISINTEGRATING ORAL EVERY 12 HOURS PRN
Status: DISCONTINUED | OUTPATIENT
Start: 2019-02-12 | End: 2019-02-13 | Stop reason: HOSPADM

## 2019-02-12 RX ORDER — GLYCOPYRROLATE 0.2 MG/ML
INJECTION INTRAMUSCULAR; INTRAVENOUS
Status: DISCONTINUED | OUTPATIENT
Start: 2019-02-12 | End: 2019-02-12

## 2019-02-12 RX ORDER — SODIUM CHLORIDE 0.9 % (FLUSH) 0.9 %
3 SYRINGE (ML) INJECTION
Status: DISCONTINUED | OUTPATIENT
Start: 2019-02-12 | End: 2019-02-12 | Stop reason: HOSPADM

## 2019-02-12 RX ORDER — CEFAZOLIN SODIUM 1 G/3ML
2 INJECTION, POWDER, FOR SOLUTION INTRAMUSCULAR; INTRAVENOUS
Status: COMPLETED | OUTPATIENT
Start: 2019-02-12 | End: 2019-02-12

## 2019-02-12 RX ORDER — FENTANYL CITRATE 50 UG/ML
25 INJECTION, SOLUTION INTRAMUSCULAR; INTRAVENOUS EVERY 5 MIN PRN
Status: DISCONTINUED | OUTPATIENT
Start: 2019-02-12 | End: 2019-02-12 | Stop reason: HOSPADM

## 2019-02-12 RX ORDER — BACITRACIN ZINC 500 UNIT/G
OINTMENT (GRAM) TOPICAL EVERY 12 HOURS
Status: DISCONTINUED | OUTPATIENT
Start: 2019-02-12 | End: 2019-02-13 | Stop reason: HOSPADM

## 2019-02-12 RX ADMIN — LIDOCAINE HYDROCHLORIDE 100 MG: 20 INJECTION, SOLUTION INTRAVENOUS at 03:02

## 2019-02-12 RX ADMIN — ONDANSETRON 4 MG: 2 INJECTION INTRAMUSCULAR; INTRAVENOUS at 02:02

## 2019-02-12 RX ADMIN — FENTANYL CITRATE 25 MCG: 50 INJECTION INTRAMUSCULAR; INTRAVENOUS at 07:02

## 2019-02-12 RX ADMIN — GLYCOPYRROLATE 0.4 MG: 0.2 INJECTION, SOLUTION INTRAMUSCULAR; INTRAVENOUS at 04:02

## 2019-02-12 RX ADMIN — FENTANYL CITRATE 25 MCG: 50 INJECTION INTRAMUSCULAR; INTRAVENOUS at 05:02

## 2019-02-12 RX ADMIN — CEFAZOLIN 2 G: 330 INJECTION, POWDER, FOR SOLUTION INTRAMUSCULAR; INTRAVENOUS at 10:02

## 2019-02-12 RX ADMIN — FENTANYL CITRATE 25 MCG: 50 INJECTION INTRAMUSCULAR; INTRAVENOUS at 04:02

## 2019-02-12 RX ADMIN — MIDAZOLAM HYDROCHLORIDE 2 MG: 1 INJECTION, SOLUTION INTRAMUSCULAR; INTRAVENOUS at 02:02

## 2019-02-12 RX ADMIN — LIDOCAINE HYDROCHLORIDE 2 MG: 10 INJECTION, SOLUTION EPIDURAL; INFILTRATION; INTRACAUDAL; PERINEURAL at 11:02

## 2019-02-12 RX ADMIN — HYDROCODONE BITARTRATE AND ACETAMINOPHEN 1 TABLET: 5; 325 TABLET ORAL at 05:02

## 2019-02-12 RX ADMIN — PROPOFOL 200 MG: 10 INJECTION, EMULSION INTRAVENOUS at 02:02

## 2019-02-12 RX ADMIN — NEOSTIGMINE METHYLSULFATE 4 MG: 1 INJECTION INTRAVENOUS at 04:02

## 2019-02-12 RX ADMIN — Medication 4 MG: at 10:02

## 2019-02-12 RX ADMIN — BACITRACIN: 500 OINTMENT TOPICAL at 08:02

## 2019-02-12 RX ADMIN — FENTANYL CITRATE 50 MCG: 50 INJECTION, SOLUTION INTRAMUSCULAR; INTRAVENOUS at 03:02

## 2019-02-12 RX ADMIN — FENTANYL CITRATE 100 MCG: 50 INJECTION, SOLUTION INTRAMUSCULAR; INTRAVENOUS at 02:02

## 2019-02-12 RX ADMIN — ACETAMINOPHEN 1000 MG: 10 INJECTION, SOLUTION INTRAVENOUS at 04:02

## 2019-02-12 RX ADMIN — FENTANYL CITRATE 50 MCG: 50 INJECTION, SOLUTION INTRAMUSCULAR; INTRAVENOUS at 04:02

## 2019-02-12 RX ADMIN — SODIUM CHLORIDE, SODIUM GLUCONATE, SODIUM ACETATE, POTASSIUM CHLORIDE, MAGNESIUM CHLORIDE, SODIUM PHOSPHATE, DIBASIC, AND POTASSIUM PHOSPHATE: .53; .5; .37; .037; .03; .012; .00082 INJECTION, SOLUTION INTRAVENOUS at 03:02

## 2019-02-12 RX ADMIN — HYDROCODONE BITARTRATE AND ACETAMINOPHEN 1 TABLET: 5; 325 TABLET ORAL at 08:02

## 2019-02-12 RX ADMIN — CEFAZOLIN 2 G: 330 INJECTION, POWDER, FOR SOLUTION INTRAMUSCULAR; INTRAVENOUS at 03:02

## 2019-02-12 RX ADMIN — SODIUM CHLORIDE: 0.9 INJECTION, SOLUTION INTRAVENOUS at 11:02

## 2019-02-12 RX ADMIN — PROMETHAZINE HYDROCHLORIDE 6.25 MG: 25 INJECTION INTRAMUSCULAR; INTRAVENOUS at 05:02

## 2019-02-12 RX ADMIN — ROCURONIUM BROMIDE 50 MG: 10 INJECTION, SOLUTION INTRAVENOUS at 02:02

## 2019-02-12 RX ADMIN — SODIUM CHLORIDE: 0.9 INJECTION, SOLUTION INTRAVENOUS at 05:02

## 2019-02-12 NOTE — TRANSFER OF CARE
"Anesthesia Transfer of Care Note    Patient: Umer Sheehan    Procedure(s) Performed: Procedure(s) (LRB):  THYROPLASTY - removal of implant (Right)    Patient location: PACU    Anesthesia Type: general    Transport from OR: Transported from OR on 6-10 L/min O2 by face mask with adequate spontaneous ventilation    Post pain: adequate analgesia    Post assessment: no apparent anesthetic complications and tolerated procedure well    Post vital signs: stable    Level of consciousness: awake, alert and oriented    Nausea/Vomiting: no nausea/vomiting    Complications: none    Transfer of care protocol was followed      Last vitals:   Visit Vitals  BP (!) 162/84 (BP Location: Right arm, Patient Position: Lying)   Pulse 83   Temp 36.3 °C (97.3 °F) (Temporal)   Resp 18   Ht 6' 4" (1.93 m)   Wt 112.5 kg (248 lb)   SpO2 98%   BMI 30.19 kg/m²     "

## 2019-02-12 NOTE — INTERVAL H&P NOTE
The patient has been examined and the H&P has been reviewed:    I concur with the findings and no changes have occurred since H&P was written.    Anesthesia/Surgery risks, benefits and alternative options discussed and understood by patient/family.          Active Hospital Problems    Diagnosis  POA    Unilateral complete vocal fold paralysis [J38.01]  Yes      Resolved Hospital Problems   No resolved problems to display.

## 2019-02-12 NOTE — PROGRESS NOTES
Assisted pt to bathroom; voided without difficulty. Back on stretcher now in pre op 2; TV on; wife at bs; no needs voiced. Anesthesia consent completed.

## 2019-02-12 NOTE — PLAN OF CARE
Pre op assessment complete. Pt needs: surgical and anesthesia consent, site garrett, and update to H&P. Call light in reach. BMP drawn and will send to lab.

## 2019-02-12 NOTE — BRIEF OP NOTE
[facility]  Brief Operative Note    SUMMARY     Surgery Date: 2/12/2019     Surgeon(s) and Role:     * Neal Cota MD - Primary     * Mohsen Abrams MD - Resident - Assisting        Pre-op Diagnosis:  Unilateral complete vocal fold paralysis [J38.01]    Post-op Diagnosis:  Post-Op Diagnosis Codes:     * Unilateral complete vocal fold paralysis [J38.01]    Procedure(s) (LRB):  THYROPLASTY - removal of implant (Right)    Anesthesia: General    Description of Procedure: DL, removal of thyroplasty implant    Description of the findings of the procedure: See op note    Estimated Blood Loss:Less than 20cc         Specimens:   Specimen (12h ago, onward)    None

## 2019-02-13 VITALS
WEIGHT: 248 LBS | BODY MASS INDEX: 30.2 KG/M2 | SYSTOLIC BLOOD PRESSURE: 133 MMHG | OXYGEN SATURATION: 95 % | HEART RATE: 68 BPM | HEIGHT: 76 IN | RESPIRATION RATE: 16 BRPM | TEMPERATURE: 98 F | DIASTOLIC BLOOD PRESSURE: 84 MMHG

## 2019-02-13 PROCEDURE — 63600175 PHARM REV CODE 636 W HCPCS: Performed by: STUDENT IN AN ORGANIZED HEALTH CARE EDUCATION/TRAINING PROGRAM

## 2019-02-13 PROCEDURE — 25000003 PHARM REV CODE 250: Performed by: STUDENT IN AN ORGANIZED HEALTH CARE EDUCATION/TRAINING PROGRAM

## 2019-02-13 RX ORDER — HYDROCODONE BITARTRATE AND ACETAMINOPHEN 7.5; 325 MG/1; MG/1
1 TABLET ORAL EVERY 6 HOURS PRN
Qty: 15 TABLET | Refills: 0 | Status: SHIPPED | OUTPATIENT
Start: 2019-02-13 | End: 2019-12-20 | Stop reason: CLARIF

## 2019-02-13 RX ORDER — HYDROCODONE BITARTRATE AND ACETAMINOPHEN 7.5; 325 MG/1; MG/1
1 TABLET ORAL EVERY 6 HOURS PRN
Status: DISCONTINUED | OUTPATIENT
Start: 2019-02-13 | End: 2019-02-13 | Stop reason: HOSPADM

## 2019-02-13 RX ADMIN — LEVOTHYROXINE SODIUM 25 MCG: 25 TABLET ORAL at 05:02

## 2019-02-13 RX ADMIN — HYDROCODONE BITARTRATE AND ACETAMINOPHEN 1 TABLET: 7.5; 325 TABLET ORAL at 09:02

## 2019-02-13 RX ADMIN — HYDROCODONE BITARTRATE AND ACETAMINOPHEN 1 TABLET: 5; 325 TABLET ORAL at 01:02

## 2019-02-13 RX ADMIN — CEFAZOLIN 2 G: 330 INJECTION, POWDER, FOR SOLUTION INTRAMUSCULAR; INTRAVENOUS at 03:02

## 2019-02-13 RX ADMIN — CEFAZOLIN 2 G: 330 INJECTION, POWDER, FOR SOLUTION INTRAMUSCULAR; INTRAVENOUS at 09:02

## 2019-02-13 RX ADMIN — PANTOPRAZOLE SODIUM 20 MG: 20 TABLET, DELAYED RELEASE ORAL at 09:02

## 2019-02-13 RX ADMIN — BACITRACIN: 500 OINTMENT TOPICAL at 09:02

## 2019-02-13 NOTE — SUBJECTIVE & OBJECTIVE
Interval History: NAEON. Reporting some pain, but controlled. Tolerating diet. Reporting that speaking is much less strained compared to before.     Medications:  Continuous Infusions:  Scheduled Meds:   bacitracin   Topical (Top) Q12H    ceFAZolin (ANCEF) IVPB  2 g Intravenous Q6H    levothyroxine  25 mcg Oral Before breakfast    pantoprazole  20 mg Oral Daily     PRN Meds:HYDROcodone-acetaminophen, morphine, ondansetron, promethazine (PHENERGAN) IVPB     Review of patient's allergies indicates:   Allergen Reactions    Doxycycline     Chlorhexidine towelette Rash     Objective:     Vital Signs (24h Range):  Temp:  [96 °F (35.6 °C)-97.8 °F (36.6 °C)] 97.7 °F (36.5 °C)  Pulse:  [53-83] 75  Resp:  [11-18] 16  SpO2:  [95 %-99 %] 96 %  BP: (111-164)/(56-93) 153/89        Lines/Drains/Airways     Drain                 Closed/Suction Drain 02/12/19 1558 Right Neck Bulb 10 Fr. less than 1 day          Peripheral Intravenous Line                 Peripheral IV - Single Lumen 02/12/19 1120 Left Wrist less than 1 day                Physical Exam    NAD  Breathing comfortably  Voice with some dysphonia but less strained compared to prior  Incision intact with sutures  Moving extremities    Significant Labs:  ABGs: No results for input(s): PH, PCO2, HCO3, POCSATURATED, BE in the last 168 hours.  BMP:   Recent Labs   Lab 02/12/19  1120   GLU 89      CO2 24   BUN 19   CREATININE 1.3   CALCIUM 9.4     Cardiac Markers: No results for input(s): CKMB, TROPONINT, MYOGLOBIN in the last 168 hours.  CBC: No results for input(s): WBC, RBC, HGB, HCT, PLT, MCV, MCH, MCHC in the last 168 hours.  CMP:   Recent Labs   Lab 02/12/19  1120   GLU 89   CALCIUM 9.4      K 4.3   CO2 24      BUN 19   CREATININE 1.3     Coagulation: No results for input(s): LABPROT, INR, APTT in the last 168 hours.  CRP: No results for input(s): CRP in the last 168 hours.  ESR: No results for input(s): ERYTHROCYTES in the last 168 hours.  LDH: No  results for input(s): LDH in the last 168 hours.  LFTs: No results for input(s): ALT, AST, ALKPHOS, BILITOT, PROT, ALBUMIN in the last 168 hours.    Significant Diagnostics:  None

## 2019-02-13 NOTE — ASSESSMENT & PLAN NOTE
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 S/p thyroplasty but had to be removed due to over injection of CAHA at outside institution. POD1 s/p thyroplasty implant removal    Routine post op care  Pain prn  Regular diet  Continue home meds  Drain harvest  Dispo: discharge home today.

## 2019-02-13 NOTE — NURSING TRANSFER
Nursing Transfer Note      2/12/2019     Transfer To: [address]    Transfer via stretcher    Transfer with rosanne    Transported by pct    Medicines sent: no    Chart send with patient: Yes    Notified: spouse

## 2019-02-13 NOTE — PROGRESS NOTES
[facility]  Otorhinolaryngology-Head & Neck Surgery  Progress Note    Subjective:     Post-Op Info:  Procedure(s) (LRB):  THYROPLASTY - removal of implant (Right)   1 Day Post-Op  Hospital Day: 2     Interval History: NAEON. Reporting some pain, but controlled. Tolerating diet. Reporting that speaking is much less strained compared to before.     Medications:  Continuous Infusions:  Scheduled Meds:   bacitracin   Topical (Top) Q12H    ceFAZolin (ANCEF) IVPB  2 g Intravenous Q6H    levothyroxine  25 mcg Oral Before breakfast    pantoprazole  20 mg Oral Daily     PRN Meds:HYDROcodone-acetaminophen, morphine, ondansetron, promethazine (PHENERGAN) IVPB     Review of patient's allergies indicates:   Allergen Reactions    Doxycycline     Chlorhexidine towelette Rash     Objective:     Vital Signs (24h Range):  Temp:  [96 °F (35.6 °C)-97.8 °F (36.6 °C)] 97.7 °F (36.5 °C)  Pulse:  [53-83] 75  Resp:  [11-18] 16  SpO2:  [95 %-99 %] 96 %  BP: (111-164)/(56-93) 153/89        Lines/Drains/Airways     Drain                 Closed/Suction Drain 02/12/19 1558 Right Neck Bulb 10 Fr. less than 1 day          Peripheral Intravenous Line                 Peripheral IV - Single Lumen 02/12/19 1120 Left Wrist less than 1 day                Physical Exam    NAD  Breathing comfortably  Voice with some dysphonia but less strained compared to prior  Incision intact with sutures  Moving extremities    Significant Labs:  ABGs: No results for input(s): PH, PCO2, HCO3, POCSATURATED, BE in the last 168 hours.  BMP:   Recent Labs   Lab 02/12/19  1120   GLU 89      CO2 24   BUN 19   CREATININE 1.3   CALCIUM 9.4     Cardiac Markers: No results for input(s): CKMB, TROPONINT, MYOGLOBIN in the last 168 hours.  CBC: No results for input(s): WBC, RBC, HGB, HCT, PLT, MCV, MCH, MCHC in the last 168 hours.  CMP:   Recent Labs   Lab 02/12/19  1120   GLU 89   CALCIUM 9.4      K 4.3   CO2 24      BUN 19   CREATININE  1.3     Coagulation: No results for input(s): LABPROT, INR, APTT in the last 168 hours.  CRP: No results for input(s): CRP in the last 168 hours.  ESR: No results for input(s): ERYTHROCYTES in the last 168 hours.  LDH: No results for input(s): LDH in the last 168 hours.  LFTs: No results for input(s): ALT, AST, ALKPHOS, BILITOT, PROT, ALBUMIN in the last 168 hours.    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 S/p thyroplasty but had to be removed due to over injection of CAHA at outside institution. POD1 s/p thyroplasty implant removal    Routine post op care  Pain prn  Regular diet  Continue home meds  Drain harvest  Dispo: discharge home today.          Mohsen Abrams MD  Otorhinolaryngology-Head & Neck Surgery  [facility]

## 2019-02-13 NOTE — ANESTHESIA POSTPROCEDURE EVALUATION
"Anesthesia Post Evaluation    Patient: Umer Sheehan    Procedure(s) Performed: Procedure(s) (LRB):  THYROPLASTY - removal of implant (Right)    Final Anesthesia Type: general  Patient location during evaluation: PACU  Patient participation: Yes- Able to Participate  Level of consciousness: awake and alert and oriented  Post-procedure vital signs: reviewed and stable  Pain management: adequate  Airway patency: patent  PONV status at discharge: No PONV  Anesthetic complications: no      Cardiovascular status: stable  Respiratory status: unassisted  Hydration status: euvolemic  Follow-up not needed.        Visit Vitals  /65   Pulse (!) 53   Temp 35.6 °C (96 °F) (Oral)   Resp 18   Ht 6' 4" (1.93 m)   Wt 112.5 kg (248 lb)   SpO2 97%   BMI 30.19 kg/m²       Pain/Kurtis Score: Pain Rating Prior to Med Admin: 6 (2/13/2019  1:38 AM)  Pain Rating Post Med Admin: 4 (2/12/2019  7:15 PM)  Kurtis Score: 10 (2/12/2019  7:15 PM)        "

## 2019-02-13 NOTE — OP NOTE
DATE OF PROCEDURE:  02/12/2019.    PREOPERATIVE DIAGNOSES:  1.  Right vocal fold paralysis.  2.  Excess calcium hydroxyapatite vocal fold injectate.    POSTOPERATIVE DIAGNOSES:  1.  Right vocal fold paralysis.  2.  Excess calcium hydroxyapatite vocal fold injectate.    PROCEDURES:  1.  Direct laryngoscopy.  2.  Revision of right thyroplasty by removal of implant.    SURGEON:  Neal Cota M.D.    ASSISTANT:  Mohsen Abrams M.D. (RES).    ANESTHESIA:  General.    BLOOD LOSS:  Less than 5 mL.    COMPLICATIONS:  None.    SPECIMENS:  None.    FINDINGS:  1.  This patient is a challenging exposure using a Dedo laryngoscope, requiring   the application of external counterpressure to achieve sufficient laryngeal   exposure.  2.  The prominence of the right thyroplasty implant was palpable just anterior   to the vocal process.  3.  The right vocal fold demonstrated excess firmness due to excess injected calcium   hydroxyapatite implant.  4.  The left true vocal fold demonstrated over-convexity and slight firmness due   to excess injected calcium hydroxyapatite implant.  5.  On neck exploration, the thyroplasty implant was found to be in the   appropriate position, flush with the window, with an intact inferior strut.    It was removed in an uneventful fashion.    INDICATIONS FOR PROCEDURE:  The patient is a 58-year-old gentleman with a   history of right vocal fold paralysis following thyroid surgery.  He has   undergone rehabilitation of this ultimately via right-sided thyroplasty surgery and derived   very significant benefit.  However, following transsphenoidal surgery, he has   experienced significant vocal deterioration.  He was evaluated by a surgeon at   an outside facility who brought him to the Operating Room and performed   bilateral vocal fold injection augmentation with calcium hydroxyapatite.  This   did not improve the patient's voice.  Moreover, he does feel that his voice has   deteriorated slightly since  this intervention.  Evaluation in the office was   consistent with glottic overclosure due to over convexity of the bilateral true   vocal folds as well as irregular mucosal findings of the right true vocal fold,   which is suspected to be related to excess, and potentially malpositioned, calcium   hydroxyapatite injectable.  Options discussed with the patient included no   treatment, voice therapy, or proceeding to the Operating Room for direct   laryngoscopy and transcervical removal of the thyroplasty implant.  The patient is eager for   expeditious improvement in his voice and therefore elected the latter.  He does   understand, nevertheless, that removal of the implant may result in worsening of   his voice, worsening of swallowing and need for additional procedures.  I had a   discussion with the patient at length about the risks and benefits thereof.  I   gave him the opportunity to ask questions and I answered all of them to his   satisfaction.  In spite of the risks of surgery, the patient desired to move   forward with the procedure.  Informed consent was obtained.    PROCEDURE IN DETAIL:  The patient was positively identified in the preoperative   holding area.  He was brought to the Operating Room and was placed in a flat   supine position.  General endotracheal anesthesia was obtained using a size 6   endotracheal tube, which was secured to the left lower lip.  A final timeout was   performed for verification purposes.  A reinforced mouthguard was placed to   protect the upper dentition.  The eyes were protected with moist sponges.  The   Dedo laryngoscope was inserted into the oral cavity via the right lingual   gutter.  It was utilized to expose the larynx at the level of the glottis.  The   larynx was inspected via direct visualization.  It was palpated with   microlaryngeal suction.  Findings were as noted above.  All equipment was   removed and attention was turned to removing of the thyroplasty  implant.  The   patient's prior neck incision was infiltrated with a solution of 1% lidocaine   with epinephrine at a concentration of 1:100,000.  The neck was prepped and   draped in the usual sterile fashion.  Skin incision was initiated using a #15   blade and was deepened through the level of the platysma using Bovie   electrocautery on a low wattage setting.  The avascular midline raphae of the   strap muscle were identified.  Strap muscles were reflected laterally off the   laryngotracheal complex.  There was significant scarring in this region due to   the patient's prior surgery.  Nevertheless, the right thyroid lamina was broadly   exposed.  Thereafter, a laterally based perichondrial flap was created and the   thyroplasty window and indwelling thyroplasty implant was found in its usual   location.  The Prolene suture, which secured the implant to the inferior strut,   was divided and was discarded.  Thereafter, the implant was mobilized around the   periphery of the window. Thereafter toothed Adson forceps and a single hook   and a stapes curette were utilized to gently mobilize the implant from the   paraglottic space.  It was liberated in one piece and was passed off the field.    The thyroplasty window was copiously irrigated.  No remnant implant was present   nor was there any evidence of injectable which was accessible via the window.    The neck was copiously irrigated.  A closed suction drain was placed, which   exited the neck via a separate stab incision.  The perichondrial flap was   reapproximated with one suture of 4-0 Vicryl.  The strap muscles were   reapproximated using interrupted sutures of 3-0 Vicryl.  The platysma was   reapproximated using buried interrupted sutures of 3-0 Vicryl.  The skin was   reapproximated with 5-0 nylon interrupted.  With this, the procedure was brought   to completion.  The patient was turned back over to the Anesthesiology team for   awakening and extubation,  which were uneventful.  The patient was escorted to   the Recovery Room in good condition.  The patient tolerated the procedure well.    There were no complications.  All needle, sponge and instrument counts were   correct at the end of the case.    ATTESTATION:  I, as the attending of record, was present and participated in all   portions of this procedure.      ANUPAM  dd: 02/12/2019 16:22:23 (CST)  td: 02/12/2019 22:13:37 (CST)  Doc ID   #[id number]  Job ID #[id number]    CC:

## 2019-02-13 NOTE — NURSING
Drain emptied retrieved 30 ml. Pain medication offered patient declined and stated that only Iv medications work.

## 2019-02-14 NOTE — DISCHARGE SUMMARY
[facility]  Discharge Summary     Patient ID:  Umer Sheehan  [id number]  58 y.o.  1960    Admit date: 2/12/2019    Discharge Date and Time:  02/13/2019 8:46 PM    Admitting Physician: Neal Cota MD     Discharge Provider: Mohsen Abrams    Reason for Admission: Unilateral complete vocal fold paralysis [J38.01]  Unilateral complete vocal fold paralysis [J38.01]  Unilateral complete vocal fold paralysis [J38.01]  Unilateral complete vocal fold paralysis [J38.01]    Admission Condition: good    Procedures Performed: Procedure(s) (LRB):  THYROPLASTY - removal of implant (Right)    Hospital Course (synopsis of major diagnoses, care, treatment, and services provided during the course of the hospital stay): Presented for scheduled surgical procedure. Did well in case. Post op admitted for obs. Did well overnight. Discharged home the next day.      Consults: None    Significant Diagnostic Studies: See results section    Final Diagnoses:    Principal Problem: Unilateral complete vocal fold paralysis   Secondary Diagnoses:   Active Hospital Problems    Diagnosis  POA    *Unilateral complete vocal fold paralysis [J38.01]  Yes      Resolved Hospital Problems   No resolved problems to display.       Discharged Condition: good    Discharge Exam:  NAD  Neck soft  Incision intact   Moving extremities    Disposition: Home or Self Care    Follow Up/Patient Instructions:     Medications:  Reconciled Home Medications:      Medication List      START taking these medications    HYDROcodone-acetaminophen 7.5-325 mg per tablet  Commonly known as:  NORCO  Take 1 tablet by mouth every 6 (six) hours as needed for Pain.        CONTINUE taking these medications    BENTYL ORAL  Take by mouth 3 (three) times daily with meals.     FISH OIL ORAL  Take 1 capsule by mouth once daily.     ondansetron 8 MG Tbdl  Commonly known as:  ZOFRAN-ODT  Take 1 tablet (8 mg total) by mouth every 12 (twelve) hours as needed.     *  pantoprazole 20 MG tablet  Commonly known as:  PROTONIX  Take 20 mg by mouth once daily.     * pantoprazole 40 MG tablet  Commonly known as:  PROTONIX  Take 40 mg by mouth once daily.     * SYNTHROID 25 MCG tablet  Generic drug:  levothyroxine  Take 25 mcg by mouth once daily.     * levothyroxine 125 MCG tablet  Commonly known as:  SYNTHROID  Take 125 mcg by mouth once daily.     TRAZODONE ORAL  Take by mouth every evening.     VITAMIN D-3 ORAL  Take by mouth once daily.     VITAMIN D2 50,000 unit Cap  Generic drug:  ergocalciferol  Take 50,000 Units by mouth every 7 days.         * This list has 4 medication(s) that are the same as other medications prescribed for you. Read the directions carefully, and ask your doctor or other care provider to review them with you.              Discharge Procedure Orders   Diet Adult Regular     Notify your health care provider if you experience any of the following:  temperature >100.4     Notify your health care provider if you experience any of the following:  persistent nausea and vomiting or diarrhea     Notify your health care provider if you experience any of the following:  severe uncontrolled pain     Notify your health care provider if you experience any of the following:  redness, tenderness, or signs of infection (pain, swelling, redness, odor or green/yellow discharge around incision site)     Notify your health care provider if you experience any of the following:  difficulty breathing or increased cough     Change dressing (specify)   Order Comments: Keep incision clean. OK to shower. Warm running water. Gently pad dry.  Apply bacitracin ointment to incision twice daily. Avoid direct sunlight.     Activity as tolerated   Order Comments: Light activity for next 10 days. No lifting greater than 10 pounds.     Follow-up Information     Neal Cota MD In 2 weeks.    Specialty:  Otolaryngology  Why:  For suture removal. Call clinic to obtain appointment.   Contact  information:  [address]  [facility] [address]  [phone]                 Activity: activity as tolerated  Diet: regular diet  Wound Care: keep wound clean and dry and as directed    Follow-up with Dr. Cota in 1 week      Signed:  Mohsen Abrams  2/13/2019  8:46 PM

## 2019-02-15 ENCOUNTER — TELEPHONE (OUTPATIENT)
Dept: OTOLARYNGOLOGY | Facility: CLINIC | Age: 59
End: 2019-02-15

## 2019-02-15 NOTE — TELEPHONE ENCOUNTER
----- Message from Rosio Galvin sent at 2/15/2019  1:08 PM CST -----  Contact[facility] [phone] please call pharmacy on above patient has questions about script sent over thanks

## 2019-02-15 NOTE — TELEPHONE ENCOUNTER
Attempted to contact. After waiting on hold for 12 minutes, had to hang up. However, no script was sent to [facility] by Dr. Cota.

## 2019-02-18 ENCOUNTER — TELEPHONE (OUTPATIENT)
Dept: OTOLARYNGOLOGY | Facility: CLINIC | Age: 59
End: 2019-02-18

## 2019-02-18 NOTE — TELEPHONE ENCOUNTER
----- Message from Rosio Galvin sent at 2/18/2019  2:33 PM CST -----  Contact: pharmacy  [phone]-pharmacy in [place] need to speak with the nurse about above patient thanks

## 2019-02-19 ENCOUNTER — TELEPHONE (OUTPATIENT)
Dept: OTOLARYNGOLOGY | Facility: CLINIC | Age: 59
End: 2019-02-19

## 2019-02-19 NOTE — TELEPHONE ENCOUNTER
----- Message from Rosio Galvin sent at 2/19/2019  9:23 AM CST -----  Contact: patient  Please call above patient at [phone] need to speak with the nurse thanks

## 2019-02-25 ENCOUNTER — OFFICE VISIT (OUTPATIENT)
Dept: OTOLARYNGOLOGY | Facility: CLINIC | Age: 59
End: 2019-02-25
Payer: COMMERCIAL

## 2019-02-25 VITALS
HEART RATE: 76 BPM | TEMPERATURE: 98 F | SYSTOLIC BLOOD PRESSURE: 134 MMHG | WEIGHT: 256.19 LBS | DIASTOLIC BLOOD PRESSURE: 80 MMHG | BODY MASS INDEX: 31.18 KG/M2

## 2019-02-25 DIAGNOSIS — J38.01 UNILATERAL COMPLETE VOCAL FOLD PARALYSIS: Primary | ICD-10-CM

## 2019-02-25 DIAGNOSIS — R49.9 VOICE DISTURBANCE: ICD-10-CM

## 2019-02-25 PROCEDURE — 99213 PR OFFICE/OUTPT VISIT, EST, LEVL III, 20-29 MIN: ICD-10-PCS | Mod: 25,S$PBB,, | Performed by: OTOLARYNGOLOGY

## 2019-02-25 PROCEDURE — 99213 OFFICE O/P EST LOW 20 MIN: CPT | Mod: PBBFAC,25 | Performed by: OTOLARYNGOLOGY

## 2019-02-25 PROCEDURE — 99999 PR PBB SHADOW E&M-EST. PATIENT-LVL III: CPT | Mod: PBBFAC,,, | Performed by: OTOLARYNGOLOGY

## 2019-02-25 PROCEDURE — 31579 LARYNGOSCOPY TELESCOPIC: CPT | Mod: S$PBB,,, | Performed by: OTOLARYNGOLOGY

## 2019-02-25 PROCEDURE — 31579 LARYNGOSCOPY TELESCOPIC: CPT | Mod: PBBFAC | Performed by: OTOLARYNGOLOGY

## 2019-02-25 PROCEDURE — 99213 OFFICE O/P EST LOW 20 MIN: CPT | Mod: 25,S$PBB,, | Performed by: OTOLARYNGOLOGY

## 2019-02-25 PROCEDURE — 99999 PR PBB SHADOW E&M-EST. PATIENT-LVL III: ICD-10-PCS | Mod: PBBFAC,,, | Performed by: OTOLARYNGOLOGY

## 2019-02-25 PROCEDURE — 31579 PR LARYNGOSCOPY, FLEX/RIGID TELESCOPIC, W/STROBOSCOPY: ICD-10-PCS | Mod: S$PBB,,, | Performed by: OTOLARYNGOLOGY

## 2019-02-25 NOTE — PROGRESS NOTES
[facility]  Department of Otorhinolaryngology and Communication Sciences    Subjective:      Umer Sheehan is a 58 y.o. male who presents for follow-up. 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 He also has a history of a pituitary tumor, s/p transsphenoidal resection.    TREATMENT HISTORY:  7/29/2016 - right vocal fold injection augmentation - restylane-L  11/18/2016 - right vocal fold injection augmentation - restylane-L  5/2/2017 - Right medialization laryngoplasty - silastic  10/1/2018 - bilateral injection aug - [facility] - Dr. Marcus  2/12/2018 - DL, removal of implant    Since his most recent surgery, he has noted significant improvements in his voice. He reports decreased vocal effort, decreased vocal strain/fatigue. It does not feel like his voice is cutting out on him as much. His voice is still rough and irregular, but he is very pleased with his progress.    A detailed review of systems was obtained with pertinent positives as per the above HPI, and otherwise negative.        Objective:     /80   Pulse 76   Temp 97.8 °F (36.6 °C)   Wt 116.2 kg (256 lb 2.8 oz)   BMI 31.18 kg/m²      Constitutional: comfortable, well dressed  Psychiatric: appropriate affect  Respiratory: comfortably breathing, symmetric chest rise, no stridor  Voice: moderate variable roughness/strain/instability; marked improvements across all parameters  Head: normocephalic  Eyes: conjunctivae and sclerae clear  Neck: incision intact; mild edema of the sub-platysmal region without any fluctuance/warmth/erythema; no evidence of fluid collection; sutures removed  Indirect laryngoscopy is limited due to gag    Procedure  Flexible Laryngeal Videostroboscopy (46491): Laryngeal videostroboscopy is indicated to assess laryngeal vibratory biomechanics and vocal fold oscillation, which cannot be assessed with a plain light examination. This was carried out transnasally with a distal chip  videoendoscope. After verbal consent was obtained, the patient was positioned and the nose was topically decongested with 1% phenylephrine and topically anesthetized with 4% lidocaine. The endoscope was passed through the most patent nasal cavity and positioned to image the nasopharynx, larynx, and hypopharynx in detail. The following featured were examined: nasopharyngeal, laryngeal, hypopharyngeal masses; velopharyngeal strength, closure, and symmetry of motion; vocal fold range and symmetry of motion; laryngeal mucosal edema, erythema, inflammation, and hydration; salivary pooling; and gross laryngeal sensation. During phonation, the vocal folds were assesses for glottal closure; mucosal wave; vocal fold lesions; vibratory periodicity, amplitude, and phase symmetry; and vertical height match. The equipment was removed. The patient tolerated the procedure well without complication. All findings were normal except:  - right vocal fold immobile, midline; persistent over-convexity of free edge; pale linear streak along superior surface  - left vocal fold with stable over-convexity  - glottal overclosure; improvement in the degree of periodic vibration but persistent periods of aperiodic vibration  - negligible posterior gap, negligible vertical height mismatch      Assessment:     Umer Sheehan is a 58 y.o. male with right vocal fold paralysis, following thyroid surgery in 6/2016, s/p thyroplasty 5/2017 and s/p CaHA injection aug at an outside institution 10/2018.    He has made favorable progress following removal of his thyroplasty implant but experiences ongoing dysphonia to persistent bilateral CaHA injectate.     Plan:     Reassurance was provided. SLP voice evaluation and subsequent therapy sessions are medically necessary for restoration of laryngeal function. We will arrange for [facility] to occur [facility] in the coming weeks.    He will follow up with me in 2-3 months.    All questions were  answered, and the patient is in agreement with the above.    Neal Cota M.D.  [facility]  Department of Otorhinolaryngology and Communication Sciences

## 2019-03-19 ENCOUNTER — CLINICAL SUPPORT (OUTPATIENT)
Dept: SPEECH THERAPY | Facility: HOSPITAL | Age: 59
End: 2019-03-19
Attending: OTOLARYNGOLOGY
Payer: OTHER GOVERNMENT

## 2019-03-19 DIAGNOSIS — J38.01 UNILATERAL COMPLETE VOCAL FOLD PARALYSIS: ICD-10-CM

## 2019-03-19 DIAGNOSIS — R49.9 VOICE DISTURBANCE: ICD-10-CM

## 2019-03-19 PROCEDURE — 92520 LARYNGEAL FUNCTION STUDIES: CPT | Mod: GN | Performed by: SPEECH-LANGUAGE PATHOLOGIST

## 2019-03-19 NOTE — PROGRESS NOTES
Referring provider: Dr. Neal Cota  Reason for visit:  Laryngeal function studies (CPT 59875)    Subjective / History    Umer Sheehan is a 58 y.o. male referred for laryngeal function studies (CPT 88392) by Dr. Cota.  He presents s/p DL with removal of thyroplasty implant due to complications from on outside facility.  Treatment history is below.  His voice worsened noticably following injection at outside facility, and he reports has been improving every day since removal of implant.  At this point, he still reports some difficulty with projecting his voice.  He has still been able to preach is and is pleased with his progress since DL.  He has a known chronic right vocal fold paralysis following total thyroidectomy 2016. He also has a history of a pituitary tumor, s/p transsphenoidal resection. His therapy goals are to improve vocal stability and strength.      TREATMENT HISTORY:  2016 - right vocal fold injection augmentation - restylane-L  2016 - right vocal fold injection augmentation - restylane-L  2017 - Right medialization laryngoplasty - silastic  10/1/2018 - bilateral injection aug [facility] - Dr. Marcus  2018 - DL, removal of implant    Swallowing: no c/o   Breathing: coughing - pt getting over bronchitis    Smokin packs/day     Stroboscopy findings (per Dr. Cota on 19)  - right vocal fold immobile, midline; persistent over-convexity of free edge; pale linear streak along superior surface  - left vocal fold with stable over-convexity  - glottal overclosure; improvement in the degree of periodic vibration but persistent periods of aperiodic vibration  - negligible posterior gap, negligible vertical height mismatch     Past Medical History:   Diagnosis Date    Arthritis     Dyslipidemia     GERD (gastroesophageal reflux disease)     Non-toxic goiter     CHHAYA (obstructive sleep apnea)      Current Outpatient Medications on File Prior to Visit   Medication Sig  "Dispense Refill    CALCIUM CARBONATE/VITAMIN D3 (VITAMIN D-3 ORAL) Take by mouth once daily.      DICYCLOMINE HCL (BENTYL ORAL) Take by mouth 3 (three) times daily with meals.      docosahexanoic acid/epa (FISH OIL ORAL) Take 1 capsule by mouth once daily.      ergocalciferol (VITAMIN D2) 50,000 unit Cap Take 50,000 Units by mouth every 7 days.      HYDROcodone-acetaminophen (NORCO) 7.5-325 mg per tablet Take 1 tablet by mouth every 6 (six) hours as needed for Pain. 15 tablet 0    levothyroxine (SYNTHROID) 125 MCG tablet Take 125 mcg by mouth once daily.      levothyroxine (SYNTHROID) 25 MCG tablet Take 25 mcg by mouth once daily.      ondansetron (ZOFRAN-ODT) 8 MG TbDL Take 1 tablet (8 mg total) by mouth every 12 (twelve) hours as needed. 20 tablet 0    pantoprazole (PROTONIX) 20 MG tablet Take 20 mg by mouth once daily.      pantoprazole (PROTONIX) 40 MG tablet Take 40 mg by mouth once daily.       trazodone HCl (TRAZODONE ORAL) Take by mouth every evening.       No current facility-administered medications on file prior to visit.        Objective    Perceptual/behavioral assessment  -CAPE-V Overall Score: 31  -Quality: mild strain, breathiness  -Volume: appropriate for age and gender  -Pitch: high F0  -Flexibility: diminished  -Habitual respiratory pattern: chest/clavicular    Acoustic/aerodynamic assessment  Multi-Dimensional Voice Program (MDVP) Analysis (sustained "ah")   Baseline Post-trial tx Gender-matched norms (M)   Average fundamental frequency (Fo) 162.614 Hz 178.285 Hz Norm/SD: 145.2 / 23.41     Relative average perturbation 1.106% 0.421% Norm/SD: 0.345 / 0.33     Shimmer percent 6.786% 7.595% Norm/SD: 2.52 / 0.997     Noise to harmonic ratio 0.156 0.15 Norm/SD: 0.12 / 0.014     Voice turbulence index 0.071 0.089 Norm/SD: 0.05 / 0.016       Phonatory Aerodynamic System (PAS) Analysis (running speech)  Max SPL 86.88 dB    (Calculated) Mean Airflow During Voicing 4.43/11.98 = 0.37 liters/sec " Total volume of expiratory airflow accompanied by voicing divided by the total time during which voicing occurred   Duration 20 seconds    Number of breaths 6      Education / Stimulability Trials   Discussed importance of vocal hygiene including: hydration. Patient was stimulable for improved voice using SOVT exercises.  Pt was stimulable for improvement and stability with straw phonation.  Practiced in isolation and in connected speech with success in carryover w/o straw.  Recorded pt practicing exercises on his phone for continued home practice.  Encouraged practicing on upcoming readings for carryover.  Pt was amenable to all suggestions.     Functional goals  Length Status Goal   Long term Initiated Patient will implement and adhere to vocal hygiene protocols on a daily basis, including the elimination of phonotraumatic behaviors.    Long term Initiated Patient and clinician will facilitate changes in vocal function in order to restore functional use of voice for daily occupational, social, and emotional demands.    Long term Initiated Patient will re-establish phonation with adequate balance of airflow and resonance with decreased muscle tension.    Long term Initiated Patient will maximize efficiency of the vocal mechanism relative to existing laryngeal disorder through coordinating subsystems of voice within 12 weeks as evidenced by patient report and SLP observations.   Short term Initiated Patient will reduce vocal effort and fatigue by decreasing upper body tension as evidenced by a decrease in symptoms and lack of observable/palpable signs of hyperkinetic muscular behaviors.   Short term Initiated Patient will complete SOVT exercises and/or resonant-focused exercises 3-5x daily to strengthen and balance the intrinsic laryngeal musculature and maximize glottic closure without medial hyperfunction.   Short term Initiated Patient will improve the quality, efficiency, and ease of phonation through resonant  and/or airflow exercises from the syllable to conversation level with 80% accuracy.   Short term Initiated Patient will discriminate between easy and strained phonation with 80% accuracy.    Short term Initiated Patient will identify the sensations associated with muscle relaxation in the abdominal, thoracic, neck and facial areas during efficient phonation with minimal clinician cue.    Short term Initiated Patient will demonstrate the ability to increase awareness of voicing behavior through self-monitoring to facilitate generalization in functional speaking situations with 80% accuracy.        Assessment     Umer Sheehan presents with mild-moderate dysphonia.  Diagnoses of Unilateral complete vocal fold paralysis and Voice disturbance were pertinent to this visit.  Prognosis for continued improvement is good.     Recommendations / POC    Recommend 2-3 sessions of voice/speech therapy over 2-6 weeks with a speech-language pathologist to optimize glottal postures for improved vocal function, vocal efficiency, and ease of phonation.  He should continue the exercises as discussed in session and should contact me with any further questions.

## 2019-05-06 ENCOUNTER — OFFICE VISIT (OUTPATIENT)
Dept: OTOLARYNGOLOGY | Facility: CLINIC | Age: 59
End: 2019-05-06
Payer: COMMERCIAL

## 2019-05-06 ENCOUNTER — CLINICAL SUPPORT (OUTPATIENT)
Dept: SPEECH THERAPY | Facility: HOSPITAL | Age: 59
End: 2019-05-06
Payer: COMMERCIAL

## 2019-05-06 VITALS
HEART RATE: 74 BPM | SYSTOLIC BLOOD PRESSURE: 121 MMHG | DIASTOLIC BLOOD PRESSURE: 77 MMHG | WEIGHT: 253 LBS | BODY MASS INDEX: 30.8 KG/M2

## 2019-05-06 DIAGNOSIS — J38.01 UNILATERAL COMPLETE VOCAL FOLD PARALYSIS: Primary | ICD-10-CM

## 2019-05-06 DIAGNOSIS — R49.9 VOICE DISTURBANCE: ICD-10-CM

## 2019-05-06 PROCEDURE — 99999 PR PBB SHADOW E&M-EST. PATIENT-LVL III: CPT | Mod: PBBFAC,,, | Performed by: OTOLARYNGOLOGY

## 2019-05-06 PROCEDURE — 31575 DIAGNOSTIC LARYNGOSCOPY: CPT | Mod: S$PBB,,, | Performed by: OTOLARYNGOLOGY

## 2019-05-06 PROCEDURE — 92507 TX SP LANG VOICE COMM INDIV: CPT | Mod: GN | Performed by: SPEECH-LANGUAGE PATHOLOGIST

## 2019-05-06 PROCEDURE — 99213 OFFICE O/P EST LOW 20 MIN: CPT | Mod: PBBFAC | Performed by: OTOLARYNGOLOGY

## 2019-05-06 PROCEDURE — 99024 POSTOP FOLLOW-UP VISIT: CPT | Mod: ,,, | Performed by: OTOLARYNGOLOGY

## 2019-05-06 PROCEDURE — 31575 PR LARYNGOSCOPY, FLEXIBLE; DIAGNOSTIC: ICD-10-PCS | Mod: S$PBB,,, | Performed by: OTOLARYNGOLOGY

## 2019-05-06 PROCEDURE — 31575 DIAGNOSTIC LARYNGOSCOPY: CPT | Mod: PBBFAC | Performed by: OTOLARYNGOLOGY

## 2019-05-06 PROCEDURE — 99024 PR POST-OP FOLLOW-UP VISIT: ICD-10-PCS | Mod: ,,, | Performed by: OTOLARYNGOLOGY

## 2019-05-06 PROCEDURE — 99999 PR PBB SHADOW E&M-EST. PATIENT-LVL III: ICD-10-PCS | Mod: PBBFAC,,, | Performed by: OTOLARYNGOLOGY

## 2019-05-06 NOTE — PROGRESS NOTES
[facility]  Department of Otorhinolaryngology and Communication Sciences    Subjective:      Umer Sheehan is a 58 y.o. male who presents for follow-up. 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 He also has a history of a pituitary tumor, s/p transsphenoidal resection.    TREATMENT HISTORY:  7/29/2016 - right vocal fold injection augmentation - restylane-L  11/18/2016 - right vocal fold injection augmentation - restylane-L  5/2/2017 - Right medialization laryngoplasty - silastic  10/1/2018 - bilateral injection aug - [facility] - Dr. Marcus  2/12/2018 - DL, removal of implant    Since his last visit, he has been doing very well. Feels he is almost 95% back to his baseline normal voice. He is very pleased with his progress.    A detailed review of systems was obtained with pertinent positives as per the above HPI, and otherwise negative.        Objective:     /77 (Patient Position: Sitting)   Pulse 74   Wt 114.8 kg (253 lb)   BMI 30.80 kg/m²      Constitutional: comfortable, well dressed  Psychiatric: appropriate affect  Respiratory: comfortably breathing, symmetric chest rise, no stridor  Voice: very mild variable roughness//instability; continued improvements across all parameters  Head: normocephalic  Eyes: conjunctivae and sclerae clear  Neck: incision intact; mild edema of the sub-platysmal region without any fluctuance/warmth/erythema; no evidence of fluid collection; sutures removed  Indirect laryngoscopy is limited due to gag    Procedure  Flexible Laryngeal Videostroboscopy (36306): Laryngeal videostroboscopy is indicated to assess laryngeal vibratory biomechanics and vocal fold oscillation, which cannot be assessed with a plain light examination. This was carried out transnasally with a distal chip videoendoscope. After verbal consent was obtained, the patient was positioned and the nose was topically decongested with 1% phenylephrine and topically  anesthetized with 4% lidocaine. The endoscope was passed through the most patent nasal cavity and positioned to image the nasopharynx, larynx, and hypopharynx in detail. The following featured were examined: nasopharyngeal, laryngeal, hypopharyngeal masses; velopharyngeal strength, closure, and symmetry of motion; vocal fold range and symmetry of motion; laryngeal mucosal edema, erythema, inflammation, and hydration; salivary pooling; and gross laryngeal sensation. During phonation, the vocal folds were assesses for glottal closure; mucosal wave; vocal fold lesions; vibratory periodicity, amplitude, and phase symmetry; and vertical height match. The equipment was removed. The patient tolerated the procedure well without complication. All findings were normal except:  - right vocal fold immobile, midline; more linear free edge; pale linear streak along superior surface with impaired pliability  - left vocal fold with stable over-convexity  - glottal overclosure, though approaching normal parameters; continued improvement in the degree of periodic vibration but with still persistent bursts of aperiodic vibration  - negligible posterior gap, negligible vertical height mismatch      Assessment:     Umer Sheehan is a 58 y.o. male with right vocal fold paralysis, following thyroid surgery in 6/2016, s/p thyroplasty 5/2017 and s/p CaHA injection aug at an outside institution 10/2018.    He has made favorable progress following removal of his thyroplasty implant but experiences ongoing dysphonia to persistent bilateral CaHA injectate.     Plan:     Reassurance was provided. I recommended adherence to his SLP voice therapy treatment plan.    He will follow up with me in 6 months, or sooner if needed.    All questions were answered, and the patient is in agreement with the above.    Neal Cota M.D.  [facility]  Department of Otorhinolaryngology and Communication Sciences

## 2019-05-06 NOTE — PROGRESS NOTES
Referring provider: Dr. Neal Cota  Reason for visit:  Voice treatment (CPT 82898)  Session #1    History / Subjective   I had the pleasure of seeing Umer Sheehan for his first treatment session following complete voice evaluation on 3/19/19.  During that time, improvements were noted on SOVT voice exercises.  Since evaluation, he has been using his exercises regularly and is pleased with his progress.  He reports he is at about 95% of his normal voice.      Objective   The primary goal of todays session was to review/adjust HEP.  This was targeted using SOVT/resonant treatment modalities.    Perceptual/behavioral assessment  -CAPE-V Overall Score: 28  -Quality: mild strain/breathiness  -Volume: appropriate for age and gender  -Pitch: appropriate for age and gender  -Flexibility: appropriate for age and gender  -Habitual respiratory pattern: chest/clavicular    Treatment  Reviewed SOVT straw phonation which pt demonstrated appropriately.  Also instructed pt on resonant /m/ humming exercises in isolation and into connected speech for carryover.  He noted a stronger quality and clearer voice.  Encouraged incorporating these exercises into his daily voice routine.  For home practice, clinician reviewed home exercises as practiced during the session with the patient.     Functional goals  Length Status Goal   Long term Met Patient will implement and adhere to vocal hygiene protocols on a daily basis, including the elimination of phonotraumatic behaviors.    Long term Ongoing Patient and clinician will facilitate changes in vocal function in order to restore functional use of voice for daily occupational, social, and emotional demands.    Long term Ongoing Patient will re-establish phonation with adequate balance of airflow and resonance with decreased muscle tension.    Long term Ongoing Patient will maximize efficiency of the vocal mechanism relative to existing laryngeal disorder through coordinating  subsystems of voice within 12 weeks as evidenced by patient report and SLP observations.   Short term Met Patient will reduce vocal effort and fatigue by decreasing upper body tension as evidenced by a decrease in symptoms and lack of observable/palpable signs of hyperkinetic muscular behaviors.   Short term Met Patient will complete SOVT exercises and/or resonant-focused exercises 3-5x daily to strengthen and balance the intrinsic laryngeal musculature and maximize glottic closure without medial hyperfunction.   Short term Met Patient will improve the quality, efficiency, and ease of phonation through resonant and/or airflow exercises from the syllable to conversation level with 80% accuracy.   Short term Met Patient will discriminate between easy and strained phonation with 80% accuracy.    Short term Met Patient will identify the sensations associated with muscle relaxation in the abdominal, thoracic, neck and facial areas during efficient phonation with minimal clinician cue.    Short term Met Patient will demonstrate the ability to increase awareness of voicing behavior through self-monitoring to facilitate generalization in functional speaking situations with 80% accuracy.        Assessment     Umer Sheehan presents with mild-moderate dysphonia.  The primary encounter diagnosis was Unilateral complete vocal fold paralysis. A diagnosis of Voice disturbance was also pertinent to this visit.  Prognosis for continued improvement is good.     Recommendations / POC    Recommend d/c from voice/speech therapy as pt has made progress toward many of his goals and is confident with his HEP.  He should continue the exercises as discussed in session and should contact me with any further questions.

## 2019-07-19 ENCOUNTER — TELEPHONE (OUTPATIENT)
Dept: OTOLARYNGOLOGY | Facility: CLINIC | Age: 59
End: 2019-07-19

## 2019-07-19 NOTE — TELEPHONE ENCOUNTER
----- Message from Michelle Meeks sent at 7/19/2019 12:38 PM CDT -----  Contact: [facility]   Needs Advice    Reason for call: Elif needs office visit notes and procedure notes.        Communication Preference: Please give Elif a call back at [phone].    Additional Information: Fax . [phone]

## 2019-07-19 NOTE — TELEPHONE ENCOUNTER
Most recent notes faxed to number provided per request.  For additional records, requested call Medical Records department.

## 2019-08-28 ENCOUNTER — TELEPHONE (OUTPATIENT)
Dept: OTOLARYNGOLOGY | Facility: CLINIC | Age: 59
End: 2019-08-28

## 2019-08-28 NOTE — TELEPHONE ENCOUNTER
----- Message from Rosio Galvin sent at 8/28/2019 10:57 AM CDT -----  Contact: patient  Please call above patient call above patient at [phone] said he need to speak with the nurse about seeing the doctor again waiting on a call back thanks

## 2019-09-24 NOTE — PROGRESS NOTES
[facility]  Department of Otorhinolaryngology and Communication Sciences    Subjective:      Umer Sheehan is a 58 y.o. male who presents for follow-up. 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 He also has a history of a pituitary tumor, s/p transsphenoidal resection.    TREATMENT HISTORY:  7/29/2016 - right vocal fold injection augmentation - restylane-L  11/18/2016 - right vocal fold injection augmentation - restylane-L  5/2/2017 - Right medialization laryngoplasty - silastic  10/1/2018 - bilateral injection aug - [facility] - Dr. Marcus  2/12/2019 - DL, removal of implant    He was doing very well since May. About 2 months ago, he began to notice changes in his voice. Voice is progressively worse since then. His vocal quality has diminished. Difficulty projecting. Worse toward the end of a Sunday. Reports his voice/throat intermittently locks up, then he swallows and he will recover his voice. Feels a lump in his throat. Also reports coughing when swallowing water form time to time.    VHI-10 = 30  RSI = 6  EAT-10 = 0  DI = 0  CSI = 0    A detailed review of systems was obtained with pertinent positives as per the above HPI, and otherwise negative.        Objective:     BP (!) 140/91   Pulse 72   Temp 98.2 °F (36.8 °C)   Wt 112.4 kg (247 lb 12.8 oz)   BMI 30.16 kg/m²      Constitutional: comfortable, well dressed  Psychiatric: appropriate affect  Respiratory: comfortably breathing, symmetric chest rise, no stridor  Voice: mild-moderate inconsistent roughness/breathiness; stimulable to improvements via increased breath support/projection and manual unloading; overall, slight deterioration since last visit yet still markedly improved sinec January evaluation  Head: normocephalic  Eyes: conjunctivae and sclerae clear  Neck: incisions well healed  Indirect laryngoscopy is limited due to gag    Procedure  Flexible Laryngeal Videostroboscopy (31946): Laryngeal  videostroboscopy is indicated to assess laryngeal vibratory biomechanics and vocal fold oscillation, which cannot be assessed with a plain light examination. This was carried out transnasally with a distal chip videoendoscope. After verbal consent was obtained, the patient was positioned and the nose was topically decongested with 1% phenylephrine and topically anesthetized with 4% lidocaine. The endoscope was passed through the most patent nasal cavity and positioned to image the nasopharynx, larynx, and hypopharynx in detail. The following featured were examined: nasopharyngeal, laryngeal, hypopharyngeal masses; velopharyngeal strength, closure, and symmetry of motion; vocal fold range and symmetry of motion; laryngeal mucosal edema, erythema, inflammation, and hydration; salivary pooling; and gross laryngeal sensation. During phonation, the vocal folds were assesses for glottal closure; mucosal wave; vocal fold lesions; vibratory periodicity, amplitude, and phase symmetry; and vertical height match. The equipment was removed. The patient tolerated the procedure well without complication. All findings were normal except:  - right vocal fold immobile, midline; linear free edge; slight linear streak along superior surface with impaired pliability  - left vocal fold with slightly decreased convexity  - complete closure  - pervasive supraglottic concentric compression  - negligible posterior gap, negligible vertical height mismatch  - variable mild unfavorable respiratory dyskinesia around the right cricoarytenoid unit      Assessment:     Umer Sheehan is a 58 y.o. male with right vocal fold paralysis, following thyroid surgery in 6/2016, s/p thyroplasty 5/2017 and s/p CaHA injection aug at an outside institution 10/2018. He has made favorable progress following removal of his thyroplasty implant but experiences ongoing dysphonia to persistent bilateral CaHA injectate, muscle tension dysphonia, and unfavorable  laryngeal synkinesis.     Plan:     SLP voice evaluation and subsequent voice therapy sessions are medically necessary for restoration of laryngeal function. [facility]    Further options we discussed included repeating a vocal fold injection augmentation, or replacing a thyroplasty implant. The patient is adverse to repeating the injection augmentation, especially due his recent history of problems with it. Because his voice remains quite serviceable, with continued complete glottal closure noted on stroboscopy, and due to his prior history of sensory problems which he attributes to his thyroplasty implant, I hesitate to replace a thyroplasty implant.    The other option we discussed, especially in light of some noted unfavorable synkinesis, was laryngeal reinnervation. I discussed with him the risks/benefits thereof. I discussed the risks, benefits and alternatives to surgery with the patient, as well as the expected postoperative course, with placement of a closed suction drain and overnight observation. Risks discussed included but were not limited to pain; bleeding, hematoma; infection; scarring (skin, vocal fold); worsening of voice; worsening of voice; worsening of swallowing function; need for additional and/or revision procedures; pharyngeal leak, fistula, mediastinitis; and airway obstruction which may necessitate tracheotomy. Also inherent in the procedure are the risks of anesthesia, including but not limited to cardiovascular complications (heart attack, arrhythmia); pulmonary (respiratory failure); neurologic (stroke); and death. I counseled him that such a surgery would be at increased risk, due to multiple prior neck operations which may have resulted in exuberant scar tissue such that identification of a candidate ansa cervicalis donor nerve and potentially the RLN may be of increased difficulty. Nevertheless, depending on his  progress afterwards, vocal fold injection augmentation or even revision thyroplasty surgery could be considered in the future.    I gave him the opportunity to ask questions and I answered all of them. He wishes to proceed.     Prior to scheduling surgery, and with his permission, I will discuss his case with some of my laryngology colleagues at an upcoming conference. He will again begin working with SLP. We will likely schedule surgery in the near future.    All questions were answered, and the patient is in agreement with the above.    Neal Cota M.D.  [facility]  Department of Otorhinolaryngology and Communication Sciences

## 2019-09-25 ENCOUNTER — OFFICE VISIT (OUTPATIENT)
Dept: OTOLARYNGOLOGY | Facility: CLINIC | Age: 59
End: 2019-09-25
Payer: COMMERCIAL

## 2019-09-25 VITALS
HEART RATE: 72 BPM | TEMPERATURE: 98 F | WEIGHT: 247.81 LBS | BODY MASS INDEX: 30.16 KG/M2 | DIASTOLIC BLOOD PRESSURE: 91 MMHG | SYSTOLIC BLOOD PRESSURE: 140 MMHG

## 2019-09-25 DIAGNOSIS — R49.9 VOICE DISTURBANCE: ICD-10-CM

## 2019-09-25 DIAGNOSIS — J38.01 UNILATERAL COMPLETE VOCAL FOLD PARALYSIS: Primary | ICD-10-CM

## 2019-09-25 PROCEDURE — 31579 LARYNGOSCOPY TELESCOPIC: CPT | Mod: S$PBB,,, | Performed by: OTOLARYNGOLOGY

## 2019-09-25 PROCEDURE — 99214 PR OFFICE/OUTPT VISIT, EST, LEVL IV, 30-39 MIN: ICD-10-PCS | Mod: 25,S$PBB,, | Performed by: OTOLARYNGOLOGY

## 2019-09-25 PROCEDURE — 99213 OFFICE O/P EST LOW 20 MIN: CPT | Mod: PBBFAC | Performed by: OTOLARYNGOLOGY

## 2019-09-25 PROCEDURE — 99999 PR PBB SHADOW E&M-EST. PATIENT-LVL III: ICD-10-PCS | Mod: PBBFAC,,, | Performed by: OTOLARYNGOLOGY

## 2019-09-25 PROCEDURE — 31579 PR LARYNGOSCOPY, FLEX/RIGID TELESCOPIC, W/STROBOSCOPY: ICD-10-PCS | Mod: S$PBB,,, | Performed by: OTOLARYNGOLOGY

## 2019-09-25 PROCEDURE — 99214 OFFICE O/P EST MOD 30 MIN: CPT | Mod: 25,S$PBB,, | Performed by: OTOLARYNGOLOGY

## 2019-09-25 PROCEDURE — 31579 LARYNGOSCOPY TELESCOPIC: CPT | Mod: PBBFAC | Performed by: OTOLARYNGOLOGY

## 2019-09-25 PROCEDURE — 99999 PR PBB SHADOW E&M-EST. PATIENT-LVL III: CPT | Mod: PBBFAC,,, | Performed by: OTOLARYNGOLOGY

## 2019-09-26 ENCOUNTER — PATIENT MESSAGE (OUTPATIENT)
Dept: OTOLARYNGOLOGY | Facility: CLINIC | Age: 59
End: 2019-09-26

## 2019-10-16 ENCOUNTER — PATIENT MESSAGE (OUTPATIENT)
Dept: OTOLARYNGOLOGY | Facility: CLINIC | Age: 59
End: 2019-10-16

## 2019-11-18 ENCOUNTER — PATIENT MESSAGE (OUTPATIENT)
Dept: OTOLARYNGOLOGY | Facility: CLINIC | Age: 59
End: 2019-11-18

## 2019-11-19 DIAGNOSIS — J38.5 LARYNGEAL SPASM: ICD-10-CM

## 2019-11-19 DIAGNOSIS — J38.01 UNILATERAL COMPLETE VOCAL FOLD PARALYSIS: Primary | ICD-10-CM

## 2019-11-19 DIAGNOSIS — R49.9 VOICE DISTURBANCE: ICD-10-CM

## 2019-11-20 ENCOUNTER — TELEPHONE (OUTPATIENT)
Dept: SPEECH THERAPY | Facility: HOSPITAL | Age: 59
End: 2019-11-20

## 2019-12-05 ENCOUNTER — CLINICAL SUPPORT (OUTPATIENT)
Dept: SPEECH THERAPY | Facility: HOSPITAL | Age: 59
End: 2019-12-05
Attending: OTOLARYNGOLOGY
Payer: COMMERCIAL

## 2019-12-05 ENCOUNTER — OFFICE VISIT (OUTPATIENT)
Dept: OTOLARYNGOLOGY | Facility: CLINIC | Age: 59
End: 2019-12-05
Payer: COMMERCIAL

## 2019-12-05 VITALS
BODY MASS INDEX: 32.07 KG/M2 | DIASTOLIC BLOOD PRESSURE: 85 MMHG | HEART RATE: 73 BPM | WEIGHT: 263.44 LBS | SYSTOLIC BLOOD PRESSURE: 135 MMHG | TEMPERATURE: 98 F

## 2019-12-05 DIAGNOSIS — R49.0 DYSPHONIA: ICD-10-CM

## 2019-12-05 DIAGNOSIS — J38.5 LARYNGEAL SPASM: ICD-10-CM

## 2019-12-05 DIAGNOSIS — J38.01 UNILATERAL COMPLETE VOCAL FOLD PARALYSIS: ICD-10-CM

## 2019-12-05 DIAGNOSIS — R49.9 VOICE DISTURBANCE: ICD-10-CM

## 2019-12-05 DIAGNOSIS — J38.01 UNILATERAL COMPLETE VOCAL FOLD PARALYSIS: Primary | ICD-10-CM

## 2019-12-05 PROCEDURE — 99214 OFFICE O/P EST MOD 30 MIN: CPT | Mod: PBBFAC,25 | Performed by: OTOLARYNGOLOGY

## 2019-12-05 PROCEDURE — 99999 PR PBB SHADOW E&M-EST. PATIENT-LVL IV: CPT | Mod: PBBFAC,,, | Performed by: OTOLARYNGOLOGY

## 2019-12-05 PROCEDURE — 99214 OFFICE O/P EST MOD 30 MIN: CPT | Mod: 25,S$PBB,, | Performed by: OTOLARYNGOLOGY

## 2019-12-05 PROCEDURE — 31579 PR LARYNGOSCOPY, FLEX/RIGID TELESCOPIC, W/STROBOSCOPY: ICD-10-PCS | Mod: S$PBB,,, | Performed by: OTOLARYNGOLOGY

## 2019-12-05 PROCEDURE — 31579 LARYNGOSCOPY TELESCOPIC: CPT | Mod: S$PBB,,, | Performed by: OTOLARYNGOLOGY

## 2019-12-05 PROCEDURE — 92524 BEHAVRAL QUALIT ANALYS VOICE: CPT | Mod: GN

## 2019-12-05 PROCEDURE — 99999 PR PBB SHADOW E&M-EST. PATIENT-LVL IV: ICD-10-PCS | Mod: PBBFAC,,, | Performed by: OTOLARYNGOLOGY

## 2019-12-05 PROCEDURE — 99214 PR OFFICE/OUTPT VISIT, EST, LEVL IV, 30-39 MIN: ICD-10-PCS | Mod: 25,S$PBB,, | Performed by: OTOLARYNGOLOGY

## 2019-12-05 PROCEDURE — 31579 LARYNGOSCOPY TELESCOPIC: CPT | Mod: PBBFAC | Performed by: OTOLARYNGOLOGY

## 2019-12-05 RX ORDER — DEXAMETHASONE SODIUM PHOSPHATE 4 MG/ML
8 INJECTION, SOLUTION INTRA-ARTICULAR; INTRALESIONAL; INTRAMUSCULAR; INTRAVENOUS; SOFT TISSUE ONCE
Status: CANCELLED | OUTPATIENT
Start: 2019-12-05 | End: 2019-12-05

## 2019-12-05 RX ORDER — LIDOCAINE HYDROCHLORIDE 10 MG/ML
1 INJECTION, SOLUTION EPIDURAL; INFILTRATION; INTRACAUDAL; PERINEURAL ONCE
Status: CANCELLED | OUTPATIENT
Start: 2019-12-05 | End: 2019-12-05

## 2019-12-05 NOTE — PROGRESS NOTES
[facility]  Department of Otorhinolaryngology and Communication Sciences    Subjective:      Umer Sheehan is a 59 y.o. male who presents for follow-up. 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 He also has a history of a pituitary tumor, s/p transsphenoidal resection.    TREATMENT HISTORY:  7/29/2016 - right vocal fold injection augmentation - restylane-L  11/18/2016 - right vocal fold injection augmentation - restylane-L  5/2/2017 - Right medialization laryngoplasty - silastic  10/1/2018 - bilateral injection aug - [facility] - Dr. Marcus  2/12/2019 - DL, removal of implant    Voice has deteriorated since his last visit. Reports slow deterioration, followed by a more drastic change at the end of November and continued worsening since then. Worked with SLP close to home. No progress. Had another visit with our SLP team today. Stimulability was noted, but with minimal carryover.     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 If he is stressed, it gets worse.    A detailed review of systems was obtained with pertinent positives as per the above HPI, and otherwise negative.        Objective:     /85   Pulse 73   Temp 98.4 °F (36.9 °C)   Wt 119.5 kg (263 lb 7.2 oz)   BMI 32.07 kg/m²      Constitutional: comfortable, well dressed  Psychiatric: appropriate affect  Respiratory: comfortably breathing, symmetric chest rise, no stridor  Voice: mild-moderate roughness/breathiness; stimulable but limited carryover; overall, slight deterioration since last visit   Head: normocephalic  Eyes: conjunctivae and sclerae clear  Neck: incisions well healed  Indirect laryngoscopy is limited due to gag    Procedure  Flexible Laryngeal Videostroboscopy (00788): Laryngeal videostroboscopy is indicated to assess laryngeal vibratory biomechanics and vocal fold oscillation, which cannot be assessed with a plain light  examination. This was carried out transnasally with a distal chip videoendoscope. After verbal consent was obtained, the patient was positioned and the nose was topically decongested with 1% phenylephrine and topically anesthetized with 4% lidocaine. The endoscope was passed through the most patent nasal cavity and positioned to image the nasopharynx, larynx, and hypopharynx in detail. The following featured were examined: nasopharyngeal, laryngeal, hypopharyngeal masses; velopharyngeal strength, closure, and symmetry of motion; vocal fold range and symmetry of motion; laryngeal mucosal edema, erythema, inflammation, and hydration; salivary pooling; and gross laryngeal sensation. During phonation, the vocal folds were assesses for glottal closure; mucosal wave; vocal fold lesions; vibratory periodicity, amplitude, and phase symmetry; and vertical height match. The equipment was removed. The patient tolerated the procedure well without complication. All findings were normal except:  - right vocal fold immobile, midline; linear free edge; slight linear streak along superior surface with impaired pliability; slight decrease in convexity  - left vocal fold with slightly decreased convexity  - complete closure but with increased open:closed ratio  - pervasive supraglottic concentric compression  - negligible posterior gap, negligible vertical height mismatch  - variable mild unfavorable synkinetic activity around the right cricoarytenoid unit      Assessment:     Umer Sheehan is a 59 y.o. male with right vocal fold paralysis, following thyroid surgery in 6/2016, s/p thyroplasty 5/2017 and s/p CaHA injection aug at an outside institution 10/2018. He has made favorable progress following removal of his thyroplasty implant, but his voice is deteriorating due to, I suspect, continued resorption of CaHA injectate and progressive glottal insufficiency.       Plan:     Further options we discussed included repeating a vocal  fold injection augmentation, or replacing a thyroplasty implant. We also discussed possible laryngeal reinnervation.    The most straightforward durable solution would be to proceed with revision thyroplasty--medialization laryngoplasty, possible arytenoid repositioning, possible cricothyroid subluxation/approximation. I discussed the risks, benefits and alternatives to surgery with the patient, as well as the expected postoperative course, with placement of a closed suction drain and overnight observation. Risks included but were not limited to pain; bleeding, hematoma; infection; reaction to the implant; scarring (skin, vocal fold); worsening of voice; implant migration and/or extrusion; need for additional and/or revision procedures; pharyngeal leak, fistula, mediastinitis; and airway obstruction which may necessitate tracheotomy. Also inherent in the procedure are the risks of anesthesia, including but not limited to cardiovascular complications (heart attack, arrhythmia); pulmonary (respiratory failure); neurologic (stroke); and death. I gave him the opportunity to ask questions and I answered all of them. He wishes to proceed. We will arrange this in the coming weeks, with preop testing triage by the anesthesiology team.    All questions were answered, and the patient is in agreement with the above.    Neal Cota M.D.  [facility]  Department of Otorhinolaryngology and Communication Sciences

## 2019-12-05 NOTE — PROGRESS NOTES
"Referring provider: Dr. Neal Cota  Reason for visit:  Behavioral and qualitative analysis of voice and resonance (CPT 77380)    Subjective / History    Umer Sheehan is a 59 y.o. male referred for voice evaluation (CPT 27590, 11364) by Dr. Cota. Patient underwent thyroplasty surgery 5/2/2017. In 2018, he underwent injection augmentation at an outside facility and reported a decline in vocal quality. He then underwent surgery on 2/12/2019 to remove the implant. Following this, he underwent 1 voice evaluation and 1 voice therapy session from 3/19/2019 to 5/6/2019. He reported that after therapy, he thought he was "over the hump" and noted improvement in his vocal quality. He presents on today's evaluation with "drastically worsening dysphonia" since the end of November. He notes that his throat feels sore. He reports that his voice becomes more dysphonic over the course of the day. He also reports dyspnea when speaking and that it feels that there is a constriction in his throat. In addition, he reports difficulty projecting. 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 Cota on 9/25/2019)  - right vocal fold immobile, midline; linear free edge; slight linear streak along superior surface with impaired pliability  - left vocal fold with slightly decreased convexity  - complete closure  - pervasive supraglottic concentric compression  - negligible posterior gap, negligible vertical height mismatch  - variable mild unfavorable respiratory dyskinesia around the right cricoarytenoid unit     Past Medical History:   Diagnosis Date    Arthritis     Dyslipidemia     GERD (gastroesophageal reflux disease)     Non-toxic goiter     CHHAYA (obstructive sleep apnea)      Current Outpatient Medications on File Prior " "to Visit   Medication Sig Dispense Refill    CALCIUM CARBONATE/VITAMIN D3 (VITAMIN D-3 ORAL) Take by mouth once daily.      DICYCLOMINE HCL (BENTYL ORAL) Take by mouth 3 (three) times daily with meals.      docosahexanoic acid/epa (FISH OIL ORAL) Take 1 capsule by mouth once daily.      ergocalciferol (VITAMIN D2) 50,000 unit Cap Take 50,000 Units by mouth every 7 days.      HYDROcodone-acetaminophen (NORCO) 7.5-325 mg per tablet Take 1 tablet by mouth every 6 (six) hours as needed for Pain. 15 tablet 0    levothyroxine (SYNTHROID) 125 MCG tablet Take 125 mcg by mouth once daily.      levothyroxine (SYNTHROID) 25 MCG tablet Take 25 mcg by mouth once daily.      ondansetron (ZOFRAN-ODT) 8 MG TbDL Take 1 tablet (8 mg total) by mouth every 12 (twelve) hours as needed. 20 tablet 0    pantoprazole (PROTONIX) 20 MG tablet Take 20 mg by mouth once daily.      pantoprazole (PROTONIX) 40 MG tablet Take 40 mg by mouth once daily.       trazodone HCl (TRAZODONE ORAL) Take by mouth every evening.       No current facility-administered medications on file prior to visit.        Objective    Perceptual/behavioral assessment  -CAPE-V Overall Score: 54  -Quality: rough, strained and breathy  -Volume: decreased projection  -Pitch: appropriate for age and gender identity  -Flexibility: diminished  -Habitual respiratory pattern: chest/clavicular    Acoustic/aerodynamic assessment  Multi-Dimensional Voice Program (MDVP) Analysis (sustained "ah")   Baseline Post-trial tx Gender-matched norms (M)   Average fundamental frequency (Fo) 138.041 Hz 128.597 Hz Norm/SD: 145.2 / 23.41     Relative average perturbation 2.421 % 2.492 % Norm/SD: 0.345 / 0.33     Shimmer percent 10.401 % 9.266 % Norm/SD: 2.52 / 0.997     Noise to harmonic ratio 0.347 0.252 Norm/SD: 0.12 / 0.014     Voice turbulence index 0.171 0.091 Norm/SD: 0.05 / 0.016       Education / Stimulability Trials   Discussed importance of vocal hygiene including: " "conservation. Patient was not stimulable for decreased strain using cup bubble and cup bubble with straw SOVT exercises. Patient was modestly stimulable for decreased strain and improved projection using straw phonation on /u/ SOVT exercises. Patient reported that "words flowed easier" after completing exercises in phrases. However, limited carryover to structured reading passages as patient endorsed running out of air in structured reading passages. Encouraged practicing exercises several times daily in isolation and into short phrases to solidify muscle memory patterns and reduce extralaryngeal tension during speech tasks.  He was amenable to all suggestions.     Functional goals  Length Status Goal   Long term Initiated Patient will re-establish phonation with adequate balance of airflow and resonance with decreased muscle tension.    Long term Initiated Patient will maximize efficiency of the vocal mechanism relative to existing laryngeal disorder through coordinating subsystems of voice within 12 weeks as evidenced by patient report and SLP observations.   Long term Initiated Patient will achieve improved/normal voice assessed with perceptual scales, acoustic and/or aerodynamic measures within 12 weeks.   Long term Initiated Patient will improve coordination of respiration and phonation for efficient vocal production at a conversational level.    Short term Initiated Patient will complete SOVT exercises and/or resonant-focused exercises 3-5x daily to strengthen and balance the intrinsic laryngeal musculature and maximize glottic closure without medial hyperfunction.   Short term Initiated Patient will improve the quality, efficiency, and ease of phonation through resonant and/or airflow exercises from the syllable to conversation level with 80% accuracy.   Short term Initiated Patient will discriminate between easy and strained phonation with 80% accuracy.    Short term Initiated Patient will demonstrate the " ability to increase awareness of voicing behavior through self-monitoring to facilitate generalization in functional speaking situations with 80% accuracy.      Assessment     Umer Sheehan presents with moderate dysphonia. Diagnoses of Unilateral complete vocal fold paralysis, Voice disturbance, and Laryngeal spasm were pertinent to this visit. Prognosis for continued improvement is good/fair.     Recommendations / POC    Recommend 2-4 sessions of voice/speech therapy over 4-8 weeks with a speech-language pathologist to optimize glottal postures for improved vocal function, vocal efficiency, and ease of phonation. He should continue the exercises as discussed in session and should contact me with any further questions.

## 2019-12-10 ENCOUNTER — PATIENT MESSAGE (OUTPATIENT)
Dept: OTOLARYNGOLOGY | Facility: CLINIC | Age: 59
End: 2019-12-10

## 2019-12-20 DIAGNOSIS — Z01.818 PREOPERATIVE TESTING: Primary | ICD-10-CM

## 2019-12-20 RX ORDER — ACETAMINOPHEN, DIPHENHYDRAMINE HCL, PHENYLEPHRINE HCL 325; 25; 5 MG/1; MG/1; MG/1
TABLET ORAL NIGHTLY
COMMUNITY

## 2019-12-20 RX ORDER — CABERGOLINE 0.5 MG/1
0.25 TABLET ORAL
COMMUNITY

## 2019-12-20 NOTE — PRE-PROCEDURE INSTRUCTIONS
Patient stated has not had any problems with anesthesia in the past. Will get a BMP and CBC in am of surgery.He said his PCP [facility].  Preop instructions given. Hold asa, asa containing products, nsaids, vitamins and supplements one week prior to surgery.Medication instructions given.   Shower the night before surgery and the morning of surgery with an antibacterial soap( hibiclens or dial antibacterial soap).  Nothing on the skin once shower. Do not apply any deodorant, lotion, powder, perfume,or aftershave.  No jewelry going to surgery.  May have solid foods, gum, and hard candy until 8 hours before surgery/procedure time.  May have clear liquids( water, gatorade, powerade or apple juice) until 2 hours prior to surgery/procedure time.  No red drinks. If in doubt , drink water. Nothing to drink 2 hours before arrival time for surgery/procedure. If you are told to take medication in the morning of surgery, it may be taken with a sip of water. If your doctor tells you something different pertaining to when to stop eating or drinking, follow your doctor's instructions.He stated knows where the surgery center is located. Call for changes in status or questions.Verbalizes understanding. Mailed preop and medication instructions.

## 2019-12-20 NOTE — ANESTHESIA PAT ROS NOTE
12/20/2019  Umer Sheehan is a 59 y.o., male.      Pre-op Assessment         Review of Systems  Anesthesia Hx:  No problems with previous Anesthesia  Denies Family Hx of Anesthesia complications.   Denies Personal Hx of Anesthesia complications.   Social:  Non-Smoker, No Alcohol Use    Hematology/Oncology:     Oncology Normal   Hematology Comments: H/o anemia 4 years ago   EENT/Dental:   Throat Symptoms (DYSPHONIA) Throat Disease: Vocal Cord Paralysis (CHRONIC RIGHT VOCAL CORD PARALYSIS)    Cardiovascular:    Denies Angina. hyperlipidemia  Functional Capacity 4 METS, ABLER TO CLIMB 2 FLIGHTS OF STAIRS    Pulmonary:  Obstructive Sleep Apnea (CHHAYA) (USES BIPAP).   Renal/:  Renal/ Normal     Hepatic/GI:  Esophageal / Stomach Disorders Gerd Controlled by chronic antireflux medication.    Musculoskeletal:  Musculoskeletal General/Symptoms: Functional capacity is ambulatory without assistance.  Joint Disease:  Arthritis BILATERAL KNEES   Neurological:  Brain Tumor H/O PITUITARY TUMOR ( TRANSPHENOIDAL RESECTION)   Endocrine:  Thyroid Disease H/O NONTOXIC GOITER  H/O THYROPLASTY (RIGHT) Metabolic Disorders, Obesity / BMI > 30  Psych:  Psychiatric Normal              Anesthesia Assessment: Preoperative EQUATION    Planned Procedure: Procedure(s) (LRB):  THYROPLASTY - revision (Right)  Requested Anesthesia Type:Local MAC  Surgeon: Neal Cota MD  Service: ENT  Known or anticipated Date of Surgery:1/14/2020    Surgeon notes: reviewed    Electronic QUestionnaire Assessment completed via nurse interview with patient.        Triage considerations:     The patient has no apparent active cardiac condition (No unstable coronary Syndrome such as severe unstable angina or recent [<1 month] myocardial infarction, decompensated CHF, severe valvular   disease or significant arrhythmia)    Previous anesthesia records:CHRISTOPHER  and No problems  2/12/2019 THYROPLASTYremoval of implant (Right Throat)   Airway/Jaw/Neck:  Airway Findings: Mouth Opening: Normal Tongue: Normal  General Airway Assessment: Adult  Mallampati: II  TM Distance: Normal, at least 6 cm          Airway Present Prior to Hospital Arrival?: No Placement Date: 02/12/19 Placement Time: 1501 Method of Intubation: Direct laryngoscopy Inserted by: CRNA Airway Device: Endotracheal Tube Mask Ventilation: Easy - oral Intubated: Postinduction Blade: House #2 Airway Device Size: 6.0 Style: Cuffed Cuff Inflation: Minimal occlusive pressure Placement Verified By: Capnometry;Auscultation;ETT Condensation Grade: Grade II Complicating Factors: Anterior larynx;Obesity Findings Post-Intubation: Positive EtCO2;Bilateral breath sounds;Atraumatic/Condition of teeth unchanged Secured at: Lips Complications: None Breath Sounds: Equal Bilateral Insertion attempts (enter comment if more than 2 attempts): 1 Removal Date: 02/12/19 Removal Time: 1634       [facility]   Subspecialty notes: ENT    Other important co-morbidities: GERD, HLD, Obesity and CHHAYA      Tests already available:  No recent tests.             Instructions given. (See in Nurse's note)    Optimization:      Plan:    Testing:  BMP and CBC       Patient  has previously scheduled Medical Appointment:NONE    Navigation: Tests Scheduled.  IN AM OF SURGERY                             Straight Line to surgery.   12/20 Discussed case with Dr. Jones. Recommended bmp and cbc in am of surgery.

## 2020-01-13 ENCOUNTER — TELEPHONE (OUTPATIENT)
Dept: OTOLARYNGOLOGY | Facility: CLINIC | Age: 60
End: 2020-01-13

## 2020-01-14 ENCOUNTER — HOSPITAL ENCOUNTER (OUTPATIENT)
Facility: HOSPITAL | Age: 60
Discharge: HOME | End: 2020-01-15
Attending: OTOLARYNGOLOGY | Admitting: OTOLARYNGOLOGY
Payer: COMMERCIAL

## 2020-01-14 ENCOUNTER — ANESTHESIA (OUTPATIENT)
Dept: SURGERY | Facility: HOSPITAL | Age: 60
End: 2020-01-14
Payer: COMMERCIAL

## 2020-01-14 ENCOUNTER — ANESTHESIA EVENT (OUTPATIENT)
Dept: SURGERY | Facility: HOSPITAL | Age: 60
End: 2020-01-14
Payer: COMMERCIAL

## 2020-01-14 DIAGNOSIS — J38.01 UNILATERAL COMPLETE VOCAL FOLD PARALYSIS: Primary | ICD-10-CM

## 2020-01-14 DIAGNOSIS — R49.0 DYSPHONIA: ICD-10-CM

## 2020-01-14 DIAGNOSIS — Z01.818 PREOPERATIVE TESTING: ICD-10-CM

## 2020-01-14 LAB
ANION GAP SERPL CALC-SCNC: 7 MMOL/L (ref 8–16)
BASOPHILS # BLD AUTO: 0.06 K/UL (ref 0–0.2)
BASOPHILS NFR BLD: 1.1 % (ref 0–1.9)
BUN SERPL-MCNC: 24 MG/DL (ref 8–23)
CALCIUM SERPL-MCNC: 9.1 MG/DL (ref 8.7–10.5)
CHLORIDE SERPL-SCNC: 109 MMOL/L (ref 95–110)
CO2 SERPL-SCNC: 25 MMOL/L (ref 23–29)
CREAT SERPL-MCNC: 1.4 MG/DL (ref 0.5–1.4)
DIFFERENTIAL METHOD: ABNORMAL
EOSINOPHIL # BLD AUTO: 0.3 K/UL (ref 0–0.5)
EOSINOPHIL NFR BLD: 5.3 % (ref 0–8)
ERYTHROCYTE [DISTWIDTH] IN BLOOD BY AUTOMATED COUNT: 12.5 % (ref 11.5–14.5)
EST. GFR  (AFRICAN AMERICAN): >60 ML/MIN/1.73 M^2
EST. GFR  (NON AFRICAN AMERICAN): 53.8 ML/MIN/1.73 M^2
GLUCOSE SERPL-MCNC: 104 MG/DL (ref 70–110)
HCT VFR BLD AUTO: 47.8 % (ref 40–54)
HGB BLD-MCNC: 15.5 G/DL (ref 14–18)
IMM GRANULOCYTES # BLD AUTO: 0.02 K/UL (ref 0–0.04)
IMM GRANULOCYTES NFR BLD AUTO: 0.4 % (ref 0–0.5)
LYMPHOCYTES # BLD AUTO: 2 K/UL (ref 1–4.8)
LYMPHOCYTES NFR BLD: 35.3 % (ref 18–48)
MCH RBC QN AUTO: 28.1 PG (ref 27–31)
MCHC RBC AUTO-ENTMCNC: 32.4 G/DL (ref 32–36)
MCV RBC AUTO: 87 FL (ref 82–98)
MONOCYTES # BLD AUTO: 0.4 K/UL (ref 0.3–1)
MONOCYTES NFR BLD: 7.4 % (ref 4–15)
NEUTROPHILS # BLD AUTO: 2.9 K/UL (ref 1.8–7.7)
NEUTROPHILS NFR BLD: 50.5 % (ref 38–73)
NRBC BLD-RTO: 0 /100 WBC
PLATELET # BLD AUTO: 154 K/UL (ref 150–350)
PMV BLD AUTO: 13.3 FL (ref 9.2–12.9)
POTASSIUM SERPL-SCNC: 4.2 MMOL/L (ref 3.5–5.1)
RBC # BLD AUTO: 5.51 M/UL (ref 4.6–6.2)
SODIUM SERPL-SCNC: 141 MMOL/L (ref 136–145)
WBC # BLD AUTO: 5.64 K/UL (ref 3.9–12.7)

## 2020-01-14 PROCEDURE — S0020 INJECTION, BUPIVICAINE HYDRO: HCPCS | Performed by: OTOLARYNGOLOGY

## 2020-01-14 PROCEDURE — 31591 LARYNGOPLASTY MEDIALIZATION: CPT | Mod: ,,, | Performed by: OTOLARYNGOLOGY

## 2020-01-14 PROCEDURE — 63600175 PHARM REV CODE 636 W HCPCS: Performed by: STUDENT IN AN ORGANIZED HEALTH CARE EDUCATION/TRAINING PROGRAM

## 2020-01-14 PROCEDURE — 25000003 PHARM REV CODE 250: Performed by: STUDENT IN AN ORGANIZED HEALTH CARE EDUCATION/TRAINING PROGRAM

## 2020-01-14 PROCEDURE — 80048 BASIC METABOLIC PNL TOTAL CA: CPT

## 2020-01-14 PROCEDURE — 63600175 PHARM REV CODE 636 W HCPCS: Performed by: OTOLARYNGOLOGY

## 2020-01-14 PROCEDURE — 25000003 PHARM REV CODE 250: Performed by: OTOLARYNGOLOGY

## 2020-01-14 PROCEDURE — 37000009 HC ANESTHESIA EA ADD 15 MINS: Performed by: OTOLARYNGOLOGY

## 2020-01-14 PROCEDURE — 27201423 OPTIME MED/SURG SUP & DEVICES STERILE SUPPLY: Performed by: OTOLARYNGOLOGY

## 2020-01-14 PROCEDURE — C1878 MATRL FOR VOCAL CORD: HCPCS | Performed by: OTOLARYNGOLOGY

## 2020-01-14 PROCEDURE — 71000033 HC RECOVERY, INTIAL HOUR: Performed by: OTOLARYNGOLOGY

## 2020-01-14 PROCEDURE — 37000008 HC ANESTHESIA 1ST 15 MINUTES: Performed by: OTOLARYNGOLOGY

## 2020-01-14 PROCEDURE — D9220A PRA ANESTHESIA: Mod: ,,, | Performed by: ANESTHESIOLOGY

## 2020-01-14 PROCEDURE — C1729 CATH, DRAINAGE: HCPCS | Performed by: OTOLARYNGOLOGY

## 2020-01-14 PROCEDURE — 85025 COMPLETE CBC W/AUTO DIFF WBC: CPT

## 2020-01-14 PROCEDURE — 31591 PR REVISION OF LARYNX; MEDIALIZATION, UNI: ICD-10-PCS | Mod: ,,, | Performed by: OTOLARYNGOLOGY

## 2020-01-14 PROCEDURE — 36000707: Performed by: OTOLARYNGOLOGY

## 2020-01-14 PROCEDURE — 36000706: Performed by: OTOLARYNGOLOGY

## 2020-01-14 PROCEDURE — D9220A PRA ANESTHESIA: ICD-10-PCS | Mod: ,,, | Performed by: ANESTHESIOLOGY

## 2020-01-14 RX ORDER — PROPOFOL 10 MG/ML
VIAL (ML) INTRAVENOUS CONTINUOUS PRN
Status: DISCONTINUED | OUTPATIENT
Start: 2020-01-14 | End: 2020-01-14

## 2020-01-14 RX ORDER — CEFAZOLIN SODIUM 1 G/3ML
2 INJECTION, POWDER, FOR SOLUTION INTRAMUSCULAR; INTRAVENOUS
Status: DISCONTINUED | OUTPATIENT
Start: 2020-01-14 | End: 2020-01-15 | Stop reason: HOSPADM

## 2020-01-14 RX ORDER — DIPHENHYDRAMINE HYDROCHLORIDE 50 MG/ML
25 INJECTION INTRAMUSCULAR; INTRAVENOUS ONCE AS NEEDED
Status: DISCONTINUED | OUTPATIENT
Start: 2020-01-14 | End: 2020-01-14

## 2020-01-14 RX ORDER — DEXAMETHASONE 4 MG/1
8 TABLET ORAL EVERY 8 HOURS
Status: DISCONTINUED | OUTPATIENT
Start: 2020-01-14 | End: 2020-01-15 | Stop reason: HOSPADM

## 2020-01-14 RX ORDER — HYDROCODONE BITARTRATE AND ACETAMINOPHEN 5; 325 MG/1; MG/1
1 TABLET ORAL EVERY 4 HOURS PRN
Status: DISCONTINUED | OUTPATIENT
Start: 2020-01-14 | End: 2020-01-15 | Stop reason: HOSPADM

## 2020-01-14 RX ORDER — MIDAZOLAM HYDROCHLORIDE 1 MG/ML
INJECTION, SOLUTION INTRAMUSCULAR; INTRAVENOUS
Status: DISCONTINUED | OUTPATIENT
Start: 2020-01-14 | End: 2020-01-14

## 2020-01-14 RX ORDER — CEFAZOLIN SODIUM 1 G/3ML
INJECTION, POWDER, FOR SOLUTION INTRAMUSCULAR; INTRAVENOUS
Status: DISCONTINUED | OUTPATIENT
Start: 2020-01-14 | End: 2020-01-14

## 2020-01-14 RX ORDER — EPINEPHRINE 1 MG/ML
INJECTION, SOLUTION INTRACARDIAC; INTRAMUSCULAR; INTRAVENOUS; SUBCUTANEOUS
Status: DISCONTINUED | OUTPATIENT
Start: 2020-01-14 | End: 2020-01-14 | Stop reason: HOSPADM

## 2020-01-14 RX ORDER — METOPROLOL TARTRATE 1 MG/ML
INJECTION, SOLUTION INTRAVENOUS
Status: DISCONTINUED | OUTPATIENT
Start: 2020-01-14 | End: 2020-01-14

## 2020-01-14 RX ORDER — LIDOCAINE HYDROCHLORIDE AND EPINEPHRINE 10; 10 MG/ML; UG/ML
INJECTION, SOLUTION INFILTRATION; PERINEURAL
Status: DISCONTINUED | OUTPATIENT
Start: 2020-01-14 | End: 2020-01-14 | Stop reason: HOSPADM

## 2020-01-14 RX ORDER — TALC
3 POWDER (GRAM) TOPICAL NIGHTLY PRN
Status: DISCONTINUED | OUTPATIENT
Start: 2020-01-14 | End: 2020-01-15 | Stop reason: HOSPADM

## 2020-01-14 RX ORDER — KETAMINE HCL IN 0.9 % NACL 50 MG/5 ML
SYRINGE (ML) INTRAVENOUS
Status: DISCONTINUED | OUTPATIENT
Start: 2020-01-14 | End: 2020-01-14

## 2020-01-14 RX ORDER — MORPHINE SULFATE 2 MG/ML
4 INJECTION, SOLUTION INTRAMUSCULAR; INTRAVENOUS EVERY 4 HOURS PRN
Status: DISCONTINUED | OUTPATIENT
Start: 2020-01-14 | End: 2020-01-15 | Stop reason: HOSPADM

## 2020-01-14 RX ORDER — SODIUM CHLORIDE 9 MG/ML
INJECTION, SOLUTION INTRAVENOUS CONTINUOUS PRN
Status: DISCONTINUED | OUTPATIENT
Start: 2020-01-14 | End: 2020-01-14

## 2020-01-14 RX ORDER — OXYCODONE HYDROCHLORIDE 5 MG/1
5 TABLET ORAL
Status: DISCONTINUED | OUTPATIENT
Start: 2020-01-14 | End: 2020-01-14

## 2020-01-14 RX ORDER — ACETAMINOPHEN 325 MG/1
650 TABLET ORAL EVERY 6 HOURS PRN
Status: DISCONTINUED | OUTPATIENT
Start: 2020-01-14 | End: 2020-01-15 | Stop reason: HOSPADM

## 2020-01-14 RX ORDER — ONDANSETRON 8 MG/1
8 TABLET, ORALLY DISINTEGRATING ORAL EVERY 8 HOURS PRN
Status: DISCONTINUED | OUTPATIENT
Start: 2020-01-14 | End: 2020-01-15 | Stop reason: HOSPADM

## 2020-01-14 RX ORDER — IBUPROFEN 600 MG/1
600 TABLET ORAL EVERY 6 HOURS PRN
Status: DISCONTINUED | OUTPATIENT
Start: 2020-01-14 | End: 2020-01-15 | Stop reason: HOSPADM

## 2020-01-14 RX ORDER — LIDOCAINE HYDROCHLORIDE 10 MG/ML
1 INJECTION, SOLUTION EPIDURAL; INFILTRATION; INTRACAUDAL; PERINEURAL ONCE
Status: DISCONTINUED | OUTPATIENT
Start: 2020-01-14 | End: 2020-01-14

## 2020-01-14 RX ORDER — FENTANYL CITRATE 50 UG/ML
25 INJECTION, SOLUTION INTRAMUSCULAR; INTRAVENOUS EVERY 5 MIN PRN
Status: DISCONTINUED | OUTPATIENT
Start: 2020-01-14 | End: 2020-01-14

## 2020-01-14 RX ORDER — PROPOFOL 10 MG/ML
VIAL (ML) INTRAVENOUS
Status: DISCONTINUED | OUTPATIENT
Start: 2020-01-14 | End: 2020-01-14

## 2020-01-14 RX ORDER — ONDANSETRON 2 MG/ML
INJECTION INTRAMUSCULAR; INTRAVENOUS
Status: DISCONTINUED | OUTPATIENT
Start: 2020-01-14 | End: 2020-01-14

## 2020-01-14 RX ORDER — SODIUM CHLORIDE 0.9 % (FLUSH) 0.9 %
3 SYRINGE (ML) INJECTION
Status: DISCONTINUED | OUTPATIENT
Start: 2020-01-14 | End: 2020-01-14

## 2020-01-14 RX ORDER — LIDOCAINE HYDROCHLORIDE 40 MG/ML
INJECTION, SOLUTION RETROBULBAR
Status: DISCONTINUED | OUTPATIENT
Start: 2020-01-14 | End: 2020-01-14 | Stop reason: HOSPADM

## 2020-01-14 RX ORDER — GLYCOPYRROLATE 0.2 MG/ML
INJECTION INTRAMUSCULAR; INTRAVENOUS
Status: DISCONTINUED | OUTPATIENT
Start: 2020-01-14 | End: 2020-01-14

## 2020-01-14 RX ORDER — ONDANSETRON 2 MG/ML
4 INJECTION INTRAMUSCULAR; INTRAVENOUS DAILY PRN
Status: DISCONTINUED | OUTPATIENT
Start: 2020-01-14 | End: 2020-01-14

## 2020-01-14 RX ORDER — CEFAZOLIN SODIUM 1 G/3ML
2 INJECTION, POWDER, FOR SOLUTION INTRAMUSCULAR; INTRAVENOUS
Status: DISCONTINUED | OUTPATIENT
Start: 2020-01-14 | End: 2020-01-14

## 2020-01-14 RX ORDER — DEXAMETHASONE SODIUM PHOSPHATE 4 MG/ML
8 INJECTION, SOLUTION INTRA-ARTICULAR; INTRALESIONAL; INTRAMUSCULAR; INTRAVENOUS; SOFT TISSUE ONCE
Status: DISCONTINUED | OUTPATIENT
Start: 2020-01-14 | End: 2020-01-14 | Stop reason: HOSPADM

## 2020-01-14 RX ORDER — DEXMEDETOMIDINE HYDROCHLORIDE 100 UG/ML
INJECTION, SOLUTION INTRAVENOUS
Status: DISCONTINUED | OUTPATIENT
Start: 2020-01-14 | End: 2020-01-14

## 2020-01-14 RX ORDER — LEVOTHYROXINE SODIUM 125 UG/1
125 TABLET ORAL DAILY
Status: DISCONTINUED | OUTPATIENT
Start: 2020-01-14 | End: 2020-01-15 | Stop reason: HOSPADM

## 2020-01-14 RX ORDER — FENTANYL CITRATE 50 UG/ML
INJECTION, SOLUTION INTRAMUSCULAR; INTRAVENOUS
Status: DISCONTINUED | OUTPATIENT
Start: 2020-01-14 | End: 2020-01-14

## 2020-01-14 RX ORDER — PANTOPRAZOLE SODIUM 40 MG/1
40 TABLET, DELAYED RELEASE ORAL DAILY
Status: DISCONTINUED | OUTPATIENT
Start: 2020-01-14 | End: 2020-01-15 | Stop reason: HOSPADM

## 2020-01-14 RX ORDER — DEXAMETHASONE SODIUM PHOSPHATE 4 MG/ML
INJECTION, SOLUTION INTRA-ARTICULAR; INTRALESIONAL; INTRAMUSCULAR; INTRAVENOUS; SOFT TISSUE
Status: DISCONTINUED | OUTPATIENT
Start: 2020-01-14 | End: 2020-01-14

## 2020-01-14 RX ORDER — BUPIVACAINE HYDROCHLORIDE 5 MG/ML
INJECTION, SOLUTION EPIDURAL; INTRACAUDAL
Status: DISCONTINUED | OUTPATIENT
Start: 2020-01-14 | End: 2020-01-14 | Stop reason: HOSPADM

## 2020-01-14 RX ADMIN — MIDAZOLAM HYDROCHLORIDE 2 MG: 1 INJECTION, SOLUTION INTRAMUSCULAR; INTRAVENOUS at 11:01

## 2020-01-14 RX ADMIN — GLYCOPYRROLATE 0.2 MG: 0.2 INJECTION, SOLUTION INTRAMUSCULAR; INTRAVENOUS at 12:01

## 2020-01-14 RX ADMIN — METOPROLOL TARTRATE 3 MG: 5 INJECTION, SOLUTION INTRAVENOUS at 01:01

## 2020-01-14 RX ADMIN — OXYCODONE HYDROCHLORIDE 5 MG: 5 TABLET ORAL at 02:01

## 2020-01-14 RX ADMIN — FENTANYL CITRATE 25 MCG: 50 INJECTION, SOLUTION INTRAMUSCULAR; INTRAVENOUS at 01:01

## 2020-01-14 RX ADMIN — ONDANSETRON 4 MG: 2 INJECTION INTRAMUSCULAR; INTRAVENOUS at 12:01

## 2020-01-14 RX ADMIN — DEXMEDETOMIDINE HYDROCHLORIDE 8 MCG: 100 INJECTION, SOLUTION, CONCENTRATE INTRAVENOUS at 12:01

## 2020-01-14 RX ADMIN — PANTOPRAZOLE SODIUM 40 MG: 40 TABLET, DELAYED RELEASE ORAL at 05:01

## 2020-01-14 RX ADMIN — CEFAZOLIN 2 G: 1 INJECTION, POWDER, FOR SOLUTION INTRAMUSCULAR; INTRAVENOUS at 08:01

## 2020-01-14 RX ADMIN — HYDROCODONE BITARTRATE AND ACETAMINOPHEN 1 TABLET: 5; 325 TABLET ORAL at 06:01

## 2020-01-14 RX ADMIN — Medication 10 MG: at 12:01

## 2020-01-14 RX ADMIN — PROPOFOL 30 MG: 10 INJECTION, EMULSION INTRAVENOUS at 11:01

## 2020-01-14 RX ADMIN — FENTANYL CITRATE 50 MCG: 50 INJECTION, SOLUTION INTRAMUSCULAR; INTRAVENOUS at 11:01

## 2020-01-14 RX ADMIN — SODIUM CHLORIDE, SODIUM GLUCONATE, SODIUM ACETATE, POTASSIUM CHLORIDE, MAGNESIUM CHLORIDE, SODIUM PHOSPHATE, DIBASIC, AND POTASSIUM PHOSPHATE: .53; .5; .37; .037; .03; .012; .00082 INJECTION, SOLUTION INTRAVENOUS at 11:01

## 2020-01-14 RX ADMIN — DEXMEDETOMIDINE HYDROCHLORIDE 8 MCG: 100 INJECTION, SOLUTION, CONCENTRATE INTRAVENOUS at 11:01

## 2020-01-14 RX ADMIN — METOPROLOL TARTRATE 2 MG: 5 INJECTION, SOLUTION INTRAVENOUS at 01:01

## 2020-01-14 RX ADMIN — PROPOFOL 25 MCG/KG/MIN: 10 INJECTION, EMULSION INTRAVENOUS at 11:01

## 2020-01-14 RX ADMIN — PROPOFOL 10 MG: 10 INJECTION, EMULSION INTRAVENOUS at 11:01

## 2020-01-14 RX ADMIN — CEFAZOLIN 2 G: 330 INJECTION, POWDER, FOR SOLUTION INTRAMUSCULAR; INTRAVENOUS at 11:01

## 2020-01-14 RX ADMIN — FENTANYL CITRATE 25 MCG: 50 INJECTION, SOLUTION INTRAMUSCULAR; INTRAVENOUS at 12:01

## 2020-01-14 RX ADMIN — DEXAMETHASONE SODIUM PHOSPHATE 12 MG: 4 INJECTION, SOLUTION INTRAMUSCULAR; INTRAVENOUS at 11:01

## 2020-01-14 RX ADMIN — SODIUM CHLORIDE: 0.9 INJECTION, SOLUTION INTRAVENOUS at 11:01

## 2020-01-14 RX ADMIN — PROPOFOL 30 MG: 10 INJECTION, EMULSION INTRAVENOUS at 01:01

## 2020-01-14 RX ADMIN — FENTANYL CITRATE 50 MCG: 50 INJECTION, SOLUTION INTRAMUSCULAR; INTRAVENOUS at 12:01

## 2020-01-14 RX ADMIN — HYDROCODONE BITARTRATE AND ACETAMINOPHEN 1 TABLET: 5; 325 TABLET ORAL at 10:01

## 2020-01-14 RX ADMIN — TRAZODONE HYDROCHLORIDE 25 MG: 50 TABLET ORAL at 10:01

## 2020-01-14 RX ADMIN — PROPOFOL 30 MG: 10 INJECTION, EMULSION INTRAVENOUS at 12:01

## 2020-01-14 RX ADMIN — Medication 3 MG: at 10:01

## 2020-01-14 NOTE — H&P
Otolaryngology - Head and Neck Surgery         Subjective:      Umer Sheehan is a 59 y.o. male who presents for follow-up. 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 He also has a history of a pituitary tumor, s/p transsphenoidal resection.     TREATMENT HISTORY:  7/29/2016 - right vocal fold injection augmentation - restylane-L  11/18/2016 - right vocal fold injection augmentation - restylane-L  5/2/2017 - Right medialization laryngoplasty - silastic  10/1/2018 - bilateral injection aug - [facility] - Dr. Marcus  2/12/2019 - DL, removal of implant     Voice has deteriorated since his last visit. Reports slow deterioration, followed by a more drastic change at the end of November and continued worsening since then. Worked with SLP close to home. No progress. Had another visit with our SLP team today. Stimulability was noted, but with minimal carryover.      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 If he is stressed, it gets worse.     A detailed review of systems was obtained with pertinent positives as per the above HPI, and otherwise negative.        Past Medical History:   Diagnosis Date    Arthritis     Dyslipidemia     GERD (gastroesophageal reflux disease)     Non-toxic goiter     CHHAYA (obstructive sleep apnea)      Past Surgical History:   Procedure Laterality Date    THYROPLASTY Right 2/12/2019    Procedure: THYROPLASTY - removal of implant;  Surgeon: Neal Cota MD;  Location: [address];  Service: ENT;  Laterality: Right;     Social History     Tobacco Use    Smoking status: Never Smoker    Smokeless tobacco: Never Used   Substance Use Topics    Alcohol use: Not on file     History reviewed. No pertinent family history.    Current Facility-Administered Medications:     ceFAZolin injection 2 g, 2 g, Intravenous, On Call Procedure, Neal Cota MD    lidocaine (PF) 10 mg/ml (1%)  injection 10 mg, 1 mL, Intradermal, Once, Neal Cota MD  Doxycycline and Chlorhexidine towelette      Objective:     Temp:  [98.3 °F (36.8 °C)] 98.3 °F (36.8 °C)  Pulse:  [60] 60  Resp:  [20] 20  SpO2:  [98 %] 98 %  BP: (144)/(90) 144/90  /85   Pulse 73   Temp 98.4 °F (36.9 °C)   Wt 119.5 kg (263 lb 7.2 oz)   BMI 32.07 kg/m²       Constitutional: comfortable, well dressed  Psychiatric: appropriate affect  Respiratory: comfortably breathing, symmetric chest rise, no stridor  Voice: mild-moderate roughness/breathiness; stimulable but limited carryover; overall, slight deterioration since last visit   Head: normocephalic  Eyes: conjunctivae and sclerae clear  Neck: incisions well healed            Assessment/Plan:            Umer Sheehan is a 59 y.o. male with right vocal fold paralysis, following thyroid surgery in 6/2016, s/p thyroplasty 5/2017 and s/p CaHA injection aug at an outside institution 10/2018. He has made favorable progress following removal of his thyroplasty implant, but his voice is deteriorating due to suspected resorption of CaHA injectate and progressive glottal insufficiency.        Plan:            To OR for  revision thyroplasty--medialization laryngoplasty, possible arytenoid repositioning, possible cricothyroid subluxation/approximation          David Muñoz, DO  Otorhinolaryngology-Head & Neck Surgery  [facility]  01/14/2020

## 2020-01-14 NOTE — TRANSFER OF CARE
"Anesthesia Transfer of Care Note    Patient: Umer Sheehan    Procedure(s) Performed: Procedure(s) (LRB):  THYROPLASTY - revision (Right)    Patient location: PACU    Anesthesia Type: MAC    Transport from OR: Transported from OR on room air with adequate spontaneous ventilation    Post pain: adequate analgesia    Post assessment: no apparent anesthetic complications    Post vital signs: stable    Level of consciousness: awake, alert and oriented    Nausea/Vomiting: no nausea/vomiting    Complications: none    Transfer of care protocol was followed      Last vitals:   Visit Vitals  /74   Pulse 72   Temp 36.5 °C (97.7 °F) (Temporal)   Resp 14   Ht 6' 4" (1.93 m)   Wt 117.9 kg (260 lb)   SpO2 (!) 92%   BMI 31.65 kg/m²     "

## 2020-01-14 NOTE — NURSING TRANSFER
Nursing Transfer Note      1/14/2020     Transfer To: 502    Transfer via stretcher    Transfer with none    Transported by pct    Medicines sent: synthroid tabs    Chart send with patient: Yes    Notified: spouse    Patient reassessed at: 1/14/20

## 2020-01-14 NOTE — ANESTHESIA PREPROCEDURE EVALUATION
[facility]  Anesthesia Pre-Operative Evaluation         Patient Name: Umer Sheehan  YOB: 1960  MRN: [id number]    SUBJECTIVE:     Pre-operative evaluation for Procedure(s) (LRB):  THYROPLASTY - revision (Right)     01/14/2020    Umer Sheehan is a 59 y.o. male w/ a significant PMHx of JARVIS, GERD, CHHAYA, pituitary tumor s/p transphenoidal resection, and chronic right sided vocal cord paralysis following total thyroidectomy in 06/2016.     Patient now presents for the above procedure(s).      LDA: None documented.    Prev airway: None documented.    Drips: None documented.    Patient Active Problem List   Diagnosis    Unilateral complete vocal fold paralysis    Voice disturbance       Review of patient's allergies indicates:   Allergen Reactions    Doxycycline     Chlorhexidine towelette Rash       Current Inpatient Medications:   lidocaine (PF) 10 mg/ml (1%)  1 mL Intradermal Once       No current facility-administered medications on file prior to encounter.      Current Outpatient Medications on File Prior to Encounter   Medication Sig Dispense Refill    cabergoline (DOSTINEX) 0.5 mg tablet Take 0.25 mg by mouth twice a week.      levothyroxine (SYNTHROID) 125 MCG tablet Take 125 mcg by mouth once daily.      melatonin 10 mg Tab Take by mouth every evening.      pantoprazole (PROTONIX) 40 MG tablet Take 40 mg by mouth once daily.       trazodone HCl (TRAZODONE ORAL) Take by mouth every evening.      ondansetron (ZOFRAN-ODT) 8 MG TbDL Take 1 tablet (8 mg total) by mouth every 12 (twelve) hours as needed. 20 tablet 0       Past Surgical History:   Procedure Laterality Date    THYROPLASTY Right 2/12/2019    Procedure: THYROPLASTY - removal of implant;  Surgeon: Neal Cota MD;  Location: [address];  Service: ENT;  Laterality: Right;       Social History     Socioeconomic History    Marital status:      Spouse name: Not on file    Number of children: Not  on file    Years of education: Not on file    Highest education level: Not on file   Occupational History    Not on file   Social Needs    Financial resource strain: Not on file    Food insecurity:     Worry: Not on file     Inability: Not on file    Transportation needs:     Medical: Not on file     Non-medical: Not on file   Tobacco Use    Smoking status: Never Smoker    Smokeless tobacco: Never Used   Substance and Sexual Activity    Alcohol use: Not on file    Drug use: Not on file    Sexual activity: Not on file   Lifestyle    Physical activity:     Days per week: Not on file     Minutes per session: Not on file    Stress: Not on file   Relationships    Social connections:     Talks on phone: Not on file     Gets together: Not on file     Attends [employer] service: Not on file     Active member of club or organization: Not on file     Attends meetings of clubs or organizations: Not on file     Relationship status: Not on file   Other Topics Concern    Not on file   Social History Narrative    Not on file       OBJECTIVE:     Vital Signs Range (Last 24H):  Temp:  [36.8 °C (98.3 °F)]   Pulse:  [60]   Resp:  [20]   BP: (144)/(90)   SpO2:  [98 %]       Significant Labs:  Lab Results   Component Value Date     02/12/2019    K 4.3 02/12/2019     02/12/2019    CREATININE 1.3 02/12/2019    BUN 19 02/12/2019    CO2 24 02/12/2019       Diagnostic Studies: No relevant studies.    EKG:   No results found for this or any previous visit.    2D ECHO:  TTE:  No results found for this or any previous visit.    RD:  No results found for this or any previous visit.    ASSESSMENT/PLAN:     Anesthesia Evaluation    I have reviewed the Patient Summary Reports.     I have reviewed the Nursing Notes.   I have reviewed the Medications.     Review of Systems  Anesthesia Hx:  No problems with previous Anesthesia  Denies Family Hx of Anesthesia complications.   Denies Personal Hx of Anesthesia complications.    Social:  Non-Smoker, No Alcohol Use    Hematology/Oncology:        Denies Current/Recent Cancer   EENT/Dental:   denies chronic allergic rhinitis   Cardiovascular:  Cardiovascular Normal  Denies Hypertension.  Denies MI.  Denies CAD.    Denies CABG/stent.  Denies Dysrhythmias.             Pulmonary:   Denies COPD.  Denies Asthma.  Denies Recent URI. Sleep Apnea    Renal/:   Denies Chronic Renal Disease.     Hepatic/GI:   GERD Denies Liver Disease.    Neurological:  Neurology Normal Denies TIA.  Denies CVA. Denies Seizures.    Endocrine:   Denies Diabetes.    Psych:   Denies Psychiatric History. anxiety          Physical Exam  General:  Obesity    Airway/Jaw/Neck:  Airway Findings: Mouth Opening: Normal Tongue: Normal  General Airway Assessment: Average  Mallampati: II  Improves to I with phonation.  TM Distance: Normal, at least 6 cm  Jaw/Neck Findings:  Neck ROM: Normal ROM      Dental:  Dental Findings: In tact   Chest/Lungs:  Chest/Lungs Findings: Normal Respiratory Rate     Heart/Vascular:  Heart Findings: Rate: Normal  Rhythm: Regular Rhythm  Sounds: Normal     Abdomen:  Abdomen Findings:  Normal, Soft, Nontender     Musculoskeletal:  Musculoskeletal Findings: Normal   Skin:  Skin Findings: Normal    Mental Status:  Mental Status Findings:  Cooperative, Alert and Oriented         Anesthesia Plan  Type of Anesthesia, risks & benefits discussed:  Anesthesia Type:  general, MAC  Patient's Preference:   Intra-op Monitoring Plan: standard ASA monitors  Intra-op Monitoring Plan Comments:   Post Op Pain Control Plan: per primary service following discharge from PACU, IV/PO Opioids PRN and multimodal analgesia  Post Op Pain Control Plan Comments:   Induction:   IV  Beta Blocker:  Patient is not currently on a Beta-Blocker (No further documentation required).       Informed Consent: Patient understands risks and agrees with Anesthesia plan.  Questions answered. Anesthesia consent signed with patient.  ASA Score: 2      Day of Surgery Review of History & Physical:    H&P update referred to the surgeon.         Ready For Surgery From Anesthesia Perspective.

## 2020-01-14 NOTE — OP NOTE
DATE OF PROCEDURE:  05/03/2017.     PREOPERATIVE DIAGNOSIS:  Right vocal fold paralysis.     POSTOPERATIVE DIAGNOSIS:  Right vocal fold paralysis.     PROCEDURE:  Revision right medialization laryngoplasty using a Silastic implant.     SURGEON:  Neal Cota M.D.     ASSISTANT:  David Muñoz D.O. (RES).     ANESTHESIA:  Local with monitored anesthesia care.     BLOOD LOSS:  Minimal.     COMPLICATIONS:  None.     SPECIMENS:  None.     FINDINGS:  Improved medialization, contour, and support of the right true vocal   fold--with a concomitant improvement in glottic closure based on of   visual and auditory analysis--was achieved with a [place] Silastic block implant   whose point of maximal medialization was 5 mm deep at the middle aspect of   the thyroplasty window, along its inferiormost extent.     INDICATIONS FOR PROCEDURE:  The patient is a 56-year-old gentleman with a   history of right vocal fold paralysis following total thyroidectomy in June 2016,  s/p thyroplasty 5/2017, followed by CaHA injection aug at an outside institution 10/2018, followed thereafter by removal of his implant. His voice improved following this most recent intervention but has again deteriorated due to CaHA absorption.  He desires a durable improvement in his voice and was   therefore offered revision laryngeal framework surgery.  Prior to the procedure, the   risks, benefits and options were discussed at length with the patient.  Risks   included but were not limited to pain, bleeding, infection, scar, continued   dysphonia, worsening of voice, implant migration or extrusion, need for   additional or revision procedures, reaction to the implant, pharyngeal leak,   fistula, mediastinitis and airway obstruction.  In spite of the risks of   surgery, the patient agreed to move forward with the procedure.  Informed   consent was obtained.     PROCEDURE IN DETAIL:  The patient was positively identified in the preoperative   holding  area.  He was brought to the Operating Room and was placed in the supine   beach chair position.  Monitored anesthesia care was administered.  A 5 cm skin   incision was marked in his prior incision, overlying the right thyroid ala within a preexisting skin   crease.  In an intermittent fashion throughout the case, the nasal cavities were   topically anesthetized and decongested with pledgets soaked in a solution of 4%   cocaine.  Preoperative flexible fiberoptic videolaryngoscopy was performed to confirm a right   vocal fold paralysis with a small posterior gap and negligible vertical height   mismatch.  The right side of the neck was infiltrated with field block of local   anesthetic in the form of a 50:50 mixture of 1% lidocaine with epinephrine at a   concentration of 1:100,000 and 0.5% Marcaine.  The neck was thereafter   prepped and draped in the usual sterile fashion.  A final timeout was performed   for verification purposes.  A skin incision was made using a 15 blade and was   deepened through the level of platysma using Bovie electrocautery on a low   wattage setting.  Subplatysmal flaps were elevated superiorly to the level of   the hyoid bone and inferiorly to the level of the cricoid cartilage.  Abundant scar tissue was noted due to multiple prior operations. Next, dissection proceeded through the midline and the strap muscles were retracted laterally, exposing the laryngotracheal complex.  Scar tissue and fibrotic perichondrium were dissected off the right thyroid ala. The inferior aspect of the thyroid   cartilage was identified, as was the inferior thyroid muscular tubercle.  The existing thyroplasty window was encountered. A fresh window was outlined over the old window, with the anteroinferior   corner positioned 7 mm back from the midline and 2 mm above   the inferior free edge of the thyroid cartilage.  A standard [facility]   thyroplasty window was outlined measuring 6 mm in height and 13 mm in  length.    The window was completed carefully using a size 3 cutting bur. A fibrotic capsule was identified in the paraglottic space. The dimensions of the window were verified on   several occasions using the window sizer.  After precise creation of the   cartilage window had been created, the fibotic capsule was incised with a   [place] blade.  The perichondrium was thereafter dissected from within the thyroplasty window  and was discarded.  Next, the Alicia elevator was used to liberate the paraglottic   space around the posterior and inferior margins of the window.  Additional mild   elevation of the paraglottic space was performed via the superior aspect of the window.   Additional elevation of the paraglottic space was achieved from beneath the inferior strut of   the window via the cricothyroid space. This resulted in complete mobilization of the   paraglottic space and dynamic motion of this anatomical space throughout the   respiratory cycle.  Next, the patient was brought through a variety of phonatory   tasks. The depth gauge was positioned in the thyroplasty window while the   larynx was inspected via flexible fiberoptic videolaryngoscopy.  Careful   palpation at the level of the window verified that the window was in the   appropriate position for medialization of the membranous vocal fold.  Next, the   patient was brought through a variety of phonatory tasks in order to ascertain   the level of support which might be necessary for the medialization implant.    Optimal voicing was noted with 5 mm of support, along the inferior   aspect of the window, at the midportion of the window. A thyroplasty implant was fashioned   to these specifications.  Thereafter, the implant was carefully positioned   through the thyroplasty window in the paraglottic space and was noted to be   Secure, in a lock-and-key fashion.  Remarkable improvement in the   patient's vocal quality. The implant was noted to be  appropriately positioned on flexible laryngoscopy--free of any anterior   overcorrection or effacement of the ventricle. There was no posterior gap at this   Point, nor any appreciable vertical height mismatch. Thus no arytenoid procedure was  carried out.  The laryngoscope was removed from the nose.  The implant   was secured to the inferior thyroplasty window strut using a suture of 4-0   Prolene.  The perichondrium was secured over the window with a suture of 3-0 vicryl.  A 10-[place] Onel-Ricci drain was placed beneath the   strap muscles in the neck and exited the neck via a separate stab incision.   The strap muscles were reapproximated in the midline using interrupted sutures   of 3-0 Vicryl.  The platysmal layer was closed using buried interrupted sutures   of 3-0 Vicryl.  The sucuticular layer was closed with a running suture of 4-0 monocryl. The drain was secured using a suture of 3-0 nylon.  The skin was closed using Dermabond adhesive.  With this, the procedure was   brought to completion.  The patient was turned back over to the Anesthesiology   team for full emergence from anesthesia, which was uneventful.  The patient was   escorted to the Recovery Room in good condition.  The patient tolerated the   procedure well.  There were no complications.  All needle, sponge and instrument   counts were correct at the end of the case.     ATTESTATION:  I, as the attending of record, was present and participated in all   portions of this procedure.

## 2020-01-14 NOTE — PLAN OF CARE
Pt vital signs wnl.  Pt verbalizes no nausea.  Pt with some pain but pain tab has been given.  Neck incision intact with varghese drain patent.

## 2020-01-15 VITALS
SYSTOLIC BLOOD PRESSURE: 138 MMHG | HEIGHT: 76 IN | RESPIRATION RATE: 17 BRPM | DIASTOLIC BLOOD PRESSURE: 75 MMHG | OXYGEN SATURATION: 95 % | TEMPERATURE: 98 F | WEIGHT: 260 LBS | HEART RATE: 78 BPM | BODY MASS INDEX: 31.66 KG/M2

## 2020-01-15 PROCEDURE — 63600175 PHARM REV CODE 636 W HCPCS: Performed by: STUDENT IN AN ORGANIZED HEALTH CARE EDUCATION/TRAINING PROGRAM

## 2020-01-15 PROCEDURE — 25000003 PHARM REV CODE 250: Performed by: STUDENT IN AN ORGANIZED HEALTH CARE EDUCATION/TRAINING PROGRAM

## 2020-01-15 RX ORDER — CEPHALEXIN 500 MG/1
500 CAPSULE ORAL EVERY 6 HOURS
Qty: 56 CAPSULE | Refills: 0 | Status: SHIPPED | OUTPATIENT
Start: 2020-01-15 | End: 2020-01-29

## 2020-01-15 RX ORDER — ONDANSETRON 8 MG/1
8 TABLET, ORALLY DISINTEGRATING ORAL EVERY 12 HOURS PRN
Qty: 10 TABLET | Refills: 0 | Status: SHIPPED | OUTPATIENT
Start: 2020-01-15 | End: 2020-01-15 | Stop reason: SDUPTHER

## 2020-01-15 RX ORDER — METHYLPREDNISOLONE 4 MG/1
TABLET ORAL
Qty: 1 PACKAGE | Refills: 0 | Status: SHIPPED | OUTPATIENT
Start: 2020-01-15 | End: 2020-02-05

## 2020-01-15 RX ORDER — CEPHALEXIN 500 MG/1
500 CAPSULE ORAL EVERY 6 HOURS
Qty: 56 CAPSULE | Refills: 0 | Status: SHIPPED | OUTPATIENT
Start: 2020-01-15 | End: 2020-01-15 | Stop reason: SDUPTHER

## 2020-01-15 RX ORDER — HYDROCODONE BITARTRATE AND ACETAMINOPHEN 5; 325 MG/1; MG/1
1 TABLET ORAL EVERY 8 HOURS PRN
Qty: 15 TABLET | Refills: 0 | Status: SHIPPED | OUTPATIENT
Start: 2020-01-15 | End: 2020-01-15

## 2020-01-15 RX ORDER — METHYLPREDNISOLONE 4 MG/1
TABLET ORAL
Qty: 1 PACKAGE | Refills: 0 | Status: SHIPPED | OUTPATIENT
Start: 2020-01-15 | End: 2020-01-15 | Stop reason: SDUPTHER

## 2020-01-15 RX ORDER — ZOLPIDEM TARTRATE 5 MG/1
5 TABLET ORAL NIGHTLY PRN
Qty: 15 TABLET | Refills: 0 | Status: SHIPPED | OUTPATIENT
Start: 2020-01-15 | End: 2020-07-15

## 2020-01-15 RX ORDER — ONDANSETRON 8 MG/1
8 TABLET, ORALLY DISINTEGRATING ORAL EVERY 12 HOURS PRN
Qty: 10 TABLET | Refills: 0 | Status: SHIPPED | OUTPATIENT
Start: 2020-01-15

## 2020-01-15 RX ORDER — ZOLPIDEM TARTRATE 5 MG/1
5 TABLET ORAL NIGHTLY PRN
Qty: 10 TABLET | Refills: 0 | Status: SHIPPED | OUTPATIENT
Start: 2020-01-15 | End: 2020-01-15 | Stop reason: SDUPTHER

## 2020-01-15 RX ORDER — HYDROCODONE BITARTRATE AND ACETAMINOPHEN 5; 325 MG/1; MG/1
1 TABLET ORAL EVERY 8 HOURS PRN
Qty: 15 TABLET | Refills: 0 | Status: SHIPPED | OUTPATIENT
Start: 2020-01-15

## 2020-01-15 RX ADMIN — LEVOTHYROXINE SODIUM 125 MCG: 125 TABLET ORAL at 08:01

## 2020-01-15 RX ADMIN — HYDROCODONE BITARTRATE AND ACETAMINOPHEN 1 TABLET: 5; 325 TABLET ORAL at 08:01

## 2020-01-15 RX ADMIN — HYDROCODONE BITARTRATE AND ACETAMINOPHEN 1 TABLET: 5; 325 TABLET ORAL at 03:01

## 2020-01-15 RX ADMIN — CEFAZOLIN 2 G: 1 INJECTION, POWDER, FOR SOLUTION INTRAMUSCULAR; INTRAVENOUS at 04:01

## 2020-01-15 NOTE — NURSING
Patient discharging once ride arrives.  Medication education, bedside meds, aftercare instructions, follow up appointment provided.  Pt verbalized understanding.

## 2020-01-15 NOTE — DISCHARGE INSTRUCTIONS
Patient Information  -No driving while taking narcotic pain medications  -Do not take any OTC products containing acetaminophen at the same time as you take your narcotic pain medication. Medications that may contain acetaminophen include but are not limited to: Excedrin and other headache medications, arthritis medications, cold and sinus medications, etc. Please review the list of active ingredients in any OTC medication prior to taking it.    Stitches (sutures), surgical staples, special strips of surgical tape, or surgical skin glue may be used to close incisions. They also help stop bleeding and speed healing. To help your incision heal, follow the tips on this handout.    Home care  Tips for home care include the following:  · Always wash your hands before touching your incision.  · Keep your incision clean and dry.  · Avoid doing things that could cause dirt or sweat to get on your incision.  · Dont pick at scabs. They help protect the wound.  · Keep your incision out of water.  · Take a sponge bath to avoid getting your incision wet, unless your healthcare provider tells you otherwise.  · Ask your provider when can you take a shower or bathe.  · Ask your provider about the best way to keep your incision dry when bathing or showering.  · Pat stitches dry if they get wet. Dont rub.  · Leave the bandage (dressing) in place until you are told to remove it or change it. Change it only as directed, using clean hands.  · After the first 12 hours, change your dressing every 24 hours, or as directed by your healthcare provider.  · Change your dressing if it gets wet or soiled.    Care for specific closures    · Skin glue. Dont put liquid, ointment, or cream on your wound while the glue is in place. Avoid activities that cause heavy sweating. Protect the wound from sunlight. Do not scratch, rub, or pick at the glue. Do not put tape directly over the glue. The glue should peel off within 5 to 10 days.      Follow-up  care  Follow up with your healthcare provider to ask how long sutures or staples should be left in place. Be sure to return for stitch or staple removal as directed. If dissolving stitches were used in your mouth, these will not need to be removed. They should fall out or dissolve on their own.  If tape closures were used, remove them yourself when your provider recommends if they have not fallen off on their own. If skin glue was used, the glue will wear off by itself.    When to seek medical care  Call your healthcare provider if you have any of the following:  · More pain, redness, swelling, bleeding, or foul-smelling discharge around the incision area  · Fever of 101.4°F or higher  · Shaking chills  · Vomiting or nausea that doesn't go away  · Numbness, coldness, or tingling around the incision area, or changes in skin color  · Opening of the sutures or wound  · Stitches or staples come apart or fall out or surgical tape falls off before 7 days or as directed by your healthcare provider

## 2020-01-15 NOTE — PLAN OF CARE
Patient discharged home to care of self on 1/15/20.     01/15/20 1531   Final Note   Assessment Type Final Discharge Note   Anticipated Discharge Disposition Home   What phone number can be called within the next 1-3 days to see how you are doing after discharge?   ([phone])   Hospital Follow Up  Appt(s) scheduled? Yes   Discharge plans and expectations educations in teach back method with documentation complete? Yes   Right Care Referral Info   Post Acute Recommendation No Care

## 2020-01-15 NOTE — DISCHARGE SUMMARY
Otorhinolaryngology-Head & Neck Surgery  [facility]  Discharge Summary      Patient Name: Umer Sheehan  MRN: [id number]  Admission Date: 1/14/2020  Discharge Date: 1/15/2020  Hospital Length of Stay: 0 days   Attending Physician: Neal Cota MD   Discharging Provider: David Muñoz DO      Reason for Hospitalization: Elective inpatient surgery    Diagnoses:   1. Unilateral complete vocal fold paralysis    2. Dysphonia    3. Preoperative testing        Procedure(s):  THYROPLASTY - revision    Hospital Course:   Please see the preoperative H&P and other available documentation for full details related to history prior to this admission.  Briefly, Umer Sheehan was admitted following scheduled elective surgery. Following a complete preoperative discussion of the risks and benefits of surgery with signed informed consent, the patient was taken to the operating room on 1/14/2020 and underwent the above stated procedures.  The patient tolerated surgery well and there were no complications.  Please see the operative report for full intraoperative findings and details.  Postoperatively, the patient did well and was transferred from the PACU to the floor in stable condition where they had a stable and uncomplicated hospital course.  Labs and vital signs remained stable and appropriate throughout course.  Diet was advanced as tolerated and the patient's pain was controlled on oral pain medications without problem.  Currently, the patient is doing well at 1 Day Post-Op and is stable and appropriate for discharge home at this time.      Disposition: Home or Self Care    Discharge Instructions:      Diet Adult Regular     Notify your health care provider if you experience any of the following:  persistent nausea and vomiting or diarrhea     Notify your health care provider if you experience any of the following:  redness, tenderness, or signs of infection (pain, swelling, redness, odor or  green/yellow discharge around incision site)     Notify your health care provider if you experience any of the following:  difficulty breathing or increased cough     No dressing needed     Activity as tolerated       Reconciled Medications:      Medication List      START taking these medications    cephALEXin 500 MG capsule  Commonly known as:  KEFLEX  Take 1 capsule (500 mg total) by mouth every 6 (six) hours. for 14 days     HYDROcodone-acetaminophen 5-325 mg per tablet  Commonly known as:  NORCO  Take 1 tablet by mouth every 8 (eight) hours as needed for Pain.     methylPREDNISolone 4 mg tablet  Commonly known as:  MEDROL DOSEPACK  use as directed        CONTINUE taking these medications    cabergoline 0.5 mg tablet  Commonly known as:  DOSTINEX  Take 0.25 mg by mouth twice a week.     levothyroxine 125 MCG tablet  Commonly known as:  SYNTHROID  Take 125 mcg by mouth once daily.     melatonin 10 mg Tab  Take by mouth every evening.     ondansetron 8 MG Tbdl  Commonly known as:  ZOFRAN-ODT  Take 1 tablet (8 mg total) by mouth every 12 (twelve) hours as needed.     pantoprazole 40 MG tablet  Commonly known as:  PROTONIX  Take 40 mg by mouth once daily.     TRAZODONE ORAL  Take by mouth every evening.            Follow Up:   Follow-up Information     Neal Cota MD In 4 weeks.    Specialty:  Otolaryngology  Contact information:  [address] [place] [id number]  [phone]                   Discharged Condition: Good      David Muñoz,   Otorhinolaryngology-Head & Neck Surgery  [facility]  01/15/2020

## 2020-01-15 NOTE — ANESTHESIA POSTPROCEDURE EVALUATION
Anesthesia Post Evaluation    Patient: Umer Sheehan    Procedure(s) Performed: Procedure(s) (LRB):  THYROPLASTY - revision (Right)    Final Anesthesia Type: general    Patient location during evaluation: PACU  Patient participation: Yes- Able to Participate  Level of consciousness: awake and alert and oriented  Post-procedure vital signs: reviewed and stable  Pain management: adequate  Airway patency: patent    PONV status at discharge: No PONV  Anesthetic complications: no      Cardiovascular status: hemodynamically stable  Respiratory status: unassisted  Hydration status: euvolemic  Follow-up not needed.                Event Time     Out of Recovery 15:00:00

## 2020-01-15 NOTE — PLAN OF CARE
01/15/20 1048   Post-Acute Status   Post-Acute Authorization Placement   Post-Acute Placement Status Awaiting Internal Medical Clearance     Patient is not medically ready at this time, Post acute needs to be determined. SW will continue to follow up for DC needs.    Padmini Juarez LMSW  [facility]   [id number]

## 2020-02-24 ENCOUNTER — OFFICE VISIT (OUTPATIENT)
Dept: OTOLARYNGOLOGY | Facility: CLINIC | Age: 60
End: 2020-02-24
Payer: COMMERCIAL

## 2020-02-24 ENCOUNTER — CLINICAL SUPPORT (OUTPATIENT)
Dept: SPEECH THERAPY | Facility: HOSPITAL | Age: 60
End: 2020-02-24
Payer: COMMERCIAL

## 2020-02-24 VITALS
BODY MASS INDEX: 32.98 KG/M2 | WEIGHT: 270.94 LBS | DIASTOLIC BLOOD PRESSURE: 88 MMHG | SYSTOLIC BLOOD PRESSURE: 137 MMHG | HEART RATE: 78 BPM

## 2020-02-24 DIAGNOSIS — R49.9 VOICE DISTURBANCE: ICD-10-CM

## 2020-02-24 DIAGNOSIS — J38.01 UNILATERAL COMPLETE VOCAL FOLD PARALYSIS: ICD-10-CM

## 2020-02-24 DIAGNOSIS — J38.5 LARYNGEAL SPASM: Primary | ICD-10-CM

## 2020-02-24 DIAGNOSIS — R49.0 DYSPHONIA: Primary | ICD-10-CM

## 2020-02-24 PROCEDURE — 92507 TX SP LANG VOICE COMM INDIV: CPT

## 2020-02-24 PROCEDURE — 99024 POSTOP FOLLOW-UP VISIT: CPT | Mod: ,,, | Performed by: OTOLARYNGOLOGY

## 2020-02-24 PROCEDURE — 99213 OFFICE O/P EST LOW 20 MIN: CPT | Mod: PBBFAC | Performed by: OTOLARYNGOLOGY

## 2020-02-24 PROCEDURE — 99024 PR POST-OP FOLLOW-UP VISIT: ICD-10-PCS | Mod: ,,, | Performed by: OTOLARYNGOLOGY

## 2020-02-24 PROCEDURE — 99999 PR PBB SHADOW E&M-EST. PATIENT-LVL III: ICD-10-PCS | Mod: PBBFAC,,, | Performed by: OTOLARYNGOLOGY

## 2020-02-24 PROCEDURE — 99999 PR PBB SHADOW E&M-EST. PATIENT-LVL III: CPT | Mod: PBBFAC,,, | Performed by: OTOLARYNGOLOGY

## 2020-02-24 RX ORDER — AMOXICILLIN AND CLAVULANATE POTASSIUM 500; 125 MG/1; MG/1
TABLET, FILM COATED ORAL
COMMUNITY
Start: 2020-02-19

## 2020-02-24 RX ORDER — SUCRALFATE 1 G/1
1 TABLET ORAL 4 TIMES DAILY
COMMUNITY

## 2020-02-24 RX ORDER — DICYCLOMINE HYDROCHLORIDE 20 MG/1
20 TABLET ORAL EVERY 6 HOURS
COMMUNITY

## 2020-02-24 NOTE — PROGRESS NOTES
"Referring provider: Dr. Neal Cota  Reason for visit: Voice treatment (CPT 09449)    Subjective / History    I had the pleasure of seeing Mr. Sheehan for his first treatment session following complete voice evaluation on 12/5/2019. Since evaluation, he underwent revision thyroplasty w/ Dr. Cota on 1/14/2020. He reports that his vocal quality has drastically improved; he feels that his voice does not freeze up anymore. He reports that some days, his voice is "average" and other times it is "super exceptional." He is pleased w/ his progress post-surgery. He would like to work with a vocal  to improve his singing voice.     Stroboscopy findings (per Dr. Cota on 2/24/2020)  - left vocal fold with slightly decreased convexity  - complete closure but with improved open:closed ratio  - pervasive supraglottic concentric compression  - negligible posterior gap, negligible vertical height mismatch  - variable mild synkinetic activity around the right cricoarytenoid unit    Past Medical History:   Diagnosis Date    Arthritis     Dyslipidemia     GERD (gastroesophageal reflux disease)     Non-toxic goiter     CHHAYA (obstructive sleep apnea)      Current Outpatient Medications on File Prior to Visit   Medication Sig Dispense Refill    cabergoline (DOSTINEX) 0.5 mg tablet Take 0.25 mg by mouth twice a week.      HYDROcodone-acetaminophen (NORCO) 5-325 mg per tablet Take 1 tablet by mouth every 8 (eight) hours as needed for Pain. 15 tablet 0    levothyroxine (SYNTHROID) 125 MCG tablet Take 125 mcg by mouth once daily.      melatonin 10 mg Tab Take by mouth every evening.      ondansetron (ZOFRAN-ODT) 8 MG TbDL Dissolve 1 tablet (8 mg total) by mouth every 12 (twelve) hours as needed. 10 tablet 0    pantoprazole (PROTONIX) 40 MG tablet Take 40 mg by mouth once daily.       trazodone HCl (TRAZODONE ORAL) Take by mouth every evening.      zolpidem (AMBIEN) 5 MG Tab Take 1 tablet (5 mg total) by mouth nightly " Addended by: SAM HODGSON on: 11/4/2019 07:58 AM     Modules accepted: Yue     "as needed. 15 tablet 0     No current facility-administered medications on file prior to visit.      Objective    Perceptual/behavioral assessment  -CAPE-V Overall Score: 33  -Quality: intermittent roughness, strain  -Volume: appropriate for age and gender  -Pitch: appropriate for age and gender identity  -Flexibility: instability  -Habitual respiratory pattern: chest/clavicular    Acoustic/aerodynamic assessment  Multi-Dimensional Voice Program (MDVP) Analysis (sustained "ah")   Baseline Post-trial tx Gender-matched norms (M)   Average fundamental frequency (Fo) 116.895 Hz 111.053 Hz Norm/SD: 145.2 / 23.41     Relative average perturbation 1.194 % 1.195 % Norm/SD: 0.345 / 0.33     Shimmer percent 10.302 % 9.018 % Norm/SD: 2.52 / 0.997     Noise to harmonic ratio 0.216 0.163 Norm/SD: 0.12 / 0.014     Voice turbulence index 0.087 0.071 Norm/SD: 0.05 / 0.016       Education / Stimulability Trials   Patient minimally stimulable for improvement with straw phonation SOVT exercises. Patient most stimulable for improved vocal quality using cup to mouth phonation and resonant /u/ SOVT exercises. Good carryover to modal pitch, connected speech, and melodic variations. Limited carryover to pitch glides. Patient reported that his voice sounded "rich," "clear," and "strong" after completing the exercises. He was encouraged to practice the exercises for 15-30 seconds at a time several times throughout the day.     Functional goals  Length Status Goal   Long term Ongoing Patient will re-establish phonation with adequate balance of airflow and resonance with decreased muscle tension.    Long term Ongoing Patient will maximize efficiency of the vocal mechanism relative to existing laryngeal disorder through coordinating subsystems of voice within 12 weeks as evidenced by patient report and SLP observations.   Long term Met Patient will achieve improved/normal voice assessed with perceptual scales, acoustic and/or aerodynamic " measures within 12 weeks.   Long term Met  Patient will improve coordination of respiration and phonation for efficient vocal production at a conversational level.    Short term Ongoing/Met Patient will complete SOVT exercises and/or resonant-focused exercises 3-5x daily to strengthen and balance the intrinsic laryngeal musculature and maximize glottic closure without medial hyperfunction.   Short term Ongoing Patient will improve the quality, efficiency, and ease of phonation through resonant and/or airflow exercises from the syllable to conversation level with 80% accuracy.   Short term Met Patient will discriminate between easy and strained phonation with 80% accuracy.    Short term Ongoing Patient will demonstrate the ability to increase awareness of voicing behavior through self-monitoring to facilitate generalization in functional speaking situations with 80% accuracy.       Assessment     Umer Sheehan presents with moderate dysphonia. Diagnoses of Unilateral complete vocal fold paralysis, Voice disturbance, and Laryngeal spasm were pertinent to this visit. Patient has made significant progress toward his goals following thyroplasty surgery.       Recommendations / POC    Patient will complete exercises as part of home-program to maximize the efficiency of his voice. In addition, he will connect with a vocal  to aid with singing in his [employer]. D/c from voice therapy at this time.

## 2020-02-24 NOTE — PROGRESS NOTES
[facility]  Department of Otorhinolaryngology and Communication Sciences    Subjective:      Umer Sheehan is a 59 y.o. male who presents for follow-up. 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 He also has a history of a pituitary tumor, s/p transsphenoidal resection.    TREATMENT HISTORY:  7/29/2016 - right vocal fold injection augmentation - restylane-L  11/18/2016 - right vocal fold injection augmentation - restylane-L  5/2/2017 - Right medialization laryngoplasty - silastic  10/1/2018 - bilateral injection aug - [place] - Dr. Marcus  2/12/2019 - DL, removal of implant  1/14/2020 - Revision right medialization laryngoplasty - silastic    He is doing great since surgery. His voice is essentially back to normal. Very pleased with his progress.    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 If he is stressed, it gets worse.    A detailed review of systems was obtained with pertinent positives as per the above HPI, and otherwise negative.        Objective:     /88 (Patient Position: Sitting)   Pulse 78   Wt 122.9 kg (270 lb 15.1 oz)   BMI 32.98 kg/m²      Constitutional: comfortable, well dressed  Psychiatric: appropriate affect  Respiratory: comfortably breathing, symmetric chest rise, no stridor  Voice: marked interval improvements  Head: normocephalic  Eyes: conjunctivae and sclerae clear  Neck: incisions well healed  Indirect laryngoscopy is limited due to gag    Procedure  Flexible Laryngeal Videostroboscopy (64725): Laryngeal videostroboscopy is indicated to assess laryngeal vibratory biomechanics and vocal fold oscillation, which cannot be assessed with a plain light examination. This was carried out transnasally with a distal chip videoendoscope. After verbal consent was obtained, the patient was positioned and the nose was topically decongested with 1% phenylephrine and topically  anesthetized with 4% lidocaine. The endoscope was passed through the most patent nasal cavity and positioned to image the nasopharynx, larynx, and hypopharynx in detail. The following featured were examined: nasopharyngeal, laryngeal, hypopharyngeal masses; velopharyngeal strength, closure, and symmetry of motion; vocal fold range and symmetry of motion; laryngeal mucosal edema, erythema, inflammation, and hydration; salivary pooling; and gross laryngeal sensation. During phonation, the vocal folds were assesses for glottal closure; mucosal wave; vocal fold lesions; vibratory periodicity, amplitude, and phase symmetry; and vertical height match. The equipment was removed. The patient tolerated the procedure well without complication. All findings were normal except:  - right vocal fold immobile, midline; linear free edge; slight linear streak along superior surface ; increase convexity/support  - left vocal fold with slightly decreased convexity  - complete closure but with improved open:closed ratio  - pervasive supraglottic concentric compression  - negligible posterior gap, negligible vertical height mismatch  - variable mild synkinetic activity around the right cricoarytenoid unit      Assessment:     Umer Sheehan is a 59 y.o. male with right vocal fold paralysis, following thyroid surgery in 6/2016. He has made great progress following revision thyroplasty surgery.     Plan:     Reassurance was provided. He will follow up with me in 3 months, or sooner if needed.    All questions were answered, and the patient is in agreement with the above.    Neal Cota M.D.  [facility]  Department of Otorhinolaryngology and Communication Sciences

## 2020-03-16 ENCOUNTER — PATIENT MESSAGE (OUTPATIENT)
Dept: OTOLARYNGOLOGY | Facility: CLINIC | Age: 60
End: 2020-03-16

## 2020-05-17 ENCOUNTER — PATIENT MESSAGE (OUTPATIENT)
Dept: OTOLARYNGOLOGY | Facility: CLINIC | Age: 60
End: 2020-05-17

## 2020-05-18 ENCOUNTER — TELEPHONE (OUTPATIENT)
Dept: OTOLARYNGOLOGY | Facility: CLINIC | Age: 60
End: 2020-05-18

## 2020-05-18 NOTE — TELEPHONE ENCOUNTER
----- Message from Rosio Galvin sent at 5/18/2020  9:19 AM CDT -----  Contact: patient  Please call above patient at [phone] need to speak with the nurse concerning appointment for today thanks.

## 2020-07-27 ENCOUNTER — OFFICE VISIT (OUTPATIENT)
Dept: OTOLARYNGOLOGY | Facility: CLINIC | Age: 60
End: 2020-07-27
Payer: COMMERCIAL

## 2020-07-27 DIAGNOSIS — G89.29 CHRONIC NONINTRACTABLE HEADACHE, UNSPECIFIED HEADACHE TYPE: ICD-10-CM

## 2020-07-27 DIAGNOSIS — R49.0 DYSPHONIA: Primary | ICD-10-CM

## 2020-07-27 DIAGNOSIS — R51.9 CHRONIC NONINTRACTABLE HEADACHE, UNSPECIFIED HEADACHE TYPE: ICD-10-CM

## 2020-07-27 DIAGNOSIS — J38.01 UNILATERAL COMPLETE VOCAL FOLD PARALYSIS: ICD-10-CM

## 2020-07-27 DIAGNOSIS — J34.89 RHINORRHEA: ICD-10-CM

## 2020-07-27 PROCEDURE — 99499 UNLISTED E&M SERVICE: CPT | Mod: 95,,, | Performed by: OTOLARYNGOLOGY

## 2020-07-27 PROCEDURE — 99499 NO LOS: ICD-10-PCS | Mod: 95,,, | Performed by: OTOLARYNGOLOGY

## 2020-07-27 NOTE — PROGRESS NOTES
[facility]  Department of Otorhinolaryngology and Communication Sciences    Non-billing video encounter.    Subjective:      Umer Sheehan is a 59 y.o. male who presents for follow-up. 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 He also has a history of a pituitary tumor, s/p transsphenoidal resection.    TREATMENT HISTORY:  7/29/2016 - right vocal fold injection augmentation - restylane-L  11/18/2016 - right vocal fold injection augmentation - restylane-L  5/2/2017 - Right medialization laryngoplasty - silastic  10/1/2018 - bilateral injection aug - [place] - Dr. Marcus  2/12/2019 - DL, removal of implant  1/14/2020 - Revision right medialization laryngoplasty - silastic    Overall, his voice remains in good shape since his surgery in January. Nevertheless, he had an EGD in which his throat was stretched in mid-April. Since that time, he has been noticing his voice deteriorates by the end of the day. He reports this is quite tolerable and he remains overall pleased with his progress.    He has had some weight fluctuation in the past few months which is finally stabilizing.     His main complaint today is that he has ongoing clear rhinorrhea and postnasal drip, at times salty, associated with a headache around and between his eyes, better when he lies down. He is having problems with his BiPAP mask at night. He has bought a [employer] system -- this has been helpful somewhat. He recently had some dental work resulting in dislodgement of a drill bit. His dentist got a cone beam CT identifying a sinus polyp. He reports that in the early postoperative period following his transsphenoidal pituitary surgery, he developed a severe leak. He was admitted with plans to surgically address the leak, but it dissipated. He reports that he has has intermittent clear, at times salty, rhinorrhea since that time. It has gotten worse in the last 5 months.     A detailed review of systems was  obtained with pertinent positives as per the above HPI, and otherwise negative.        Objective:     Constitutional: comfortable, well dressed  Psychiatric: appropriate affect  Respiratory: comfortably breathing, symmetric chest rise, no stridor  Voice: marked durable interval improvements; normal for age/gender  Head: normocephalic  Eyes: conjunctivae and sclerae clear  Neck: incisions well healed  Indirect laryngoscopy is limited due to gag      Assessment:     Umer Sheehan is a 59 y.o. male with right vocal fold paralysis, following thyroid surgery in 6/2016. He has made great progress following revision thyroplasty surgery.    He is s/p transsphenoidal pituitary surgery. His symptoms of headache and clear rhinorrhea might be indicative of a CSF leak.     Plan:     I strongly encouraged him to be reassessed by his neurosurgeon/skull base surgeon. I also provided him with the name of our rhinologist/skul base surgeon, Dr. Harkins, here at [place], in the event that he may wish to have a second opinion.    All questions were answered, and the patient is in agreement with the above.    Neal Cota M.D.  [facility]  Department of Otorhinolaryngology and Communication Sciences

## (undated) DEVICE — TRAY MINOR GEN SURG

## (undated) DEVICE — GAUZE SPONGE PEANUT STRL

## (undated) DEVICE — BLADE SURG #15 CARBON STEEL

## (undated) DEVICE — SUT ETHILON 3-0 PS2 18 BLK

## (undated) DEVICE — BLADE SCALP OPHTL RND TIP

## (undated) DEVICE — SUT 3-0 12-18IN SILK

## (undated) DEVICE — ELECTRODE REM PLYHSV RETURN 9

## (undated) DEVICE — SUT VICRYL PLUS 3-0 SH 18IN

## (undated) DEVICE — CUP MEDICINE STERILE 2OZ

## (undated) DEVICE — SEE MEDLINE ITEM 157194

## (undated) DEVICE — DRAIN CHANNEL ROUND 10FR

## (undated) DEVICE — CORD BIPOLAR 12 FOOT

## (undated) DEVICE — DRAPE STERI INSTRUMENT 1018

## (undated) DEVICE — SPONGE PATTY SURGICAL .5X3IN

## (undated) DEVICE — NDL STRAIGHT 4CM LEIBINGER

## (undated) DEVICE — SYR B-D DISP CONTROL 10CC100/C

## (undated) DEVICE — SKINMARKER & RULER REGULAR X-F

## (undated) DEVICE — ELECTRODE BLADE INSULATED 1 IN

## (undated) DEVICE — SUT 4-0 VICRYL / SH

## (undated) DEVICE — TIP KERRISON RONGEUR THIN 3MM

## (undated) DEVICE — GOWN SURGICAL X-LARGE

## (undated) DEVICE — Device

## (undated) DEVICE — SYS CLSR DERMABOND PRINEO 22CM

## (undated) DEVICE — DRAIN ROUND SUCTION 10FR

## (undated) DEVICE — CHLORAPREP 10.5 ML APPLICATOR

## (undated) DEVICE — EVACUATOR WOUND BULB 100CC

## (undated) DEVICE — KIT ANTIFOG

## (undated) DEVICE — SEE MEDLINE ITEM 152622

## (undated) DEVICE — BURR ROUND CUTTING 3.0MM

## (undated) DEVICE — SHEET EENT SPLIT

## (undated) DEVICE — NDL HYPO REG 25G X 1 1/2

## (undated) DEVICE — DRESSING SURGICAL 1/2X1/2